# Patient Record
Sex: MALE | Race: WHITE | Employment: STUDENT | URBAN - METROPOLITAN AREA
[De-identification: names, ages, dates, MRNs, and addresses within clinical notes are randomized per-mention and may not be internally consistent; named-entity substitution may affect disease eponyms.]

---
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
## 2020-01-03 ENCOUNTER — TELEPHONE (OUTPATIENT)
Dept: ENDOCRINOLOGY | Facility: CLINIC | Age: 20
End: 2020-01-03

## 2020-01-03 NOTE — TELEPHONE ENCOUNTER
Patient has not been seen since April 2019. Called and spoke with mom to schedule an appointment. Appointment was scheduled on 1/23 at 12:30pm.    Nanda GONZALEZ ([place]), RN  Pediatric Diabetes Educator  [facility]  [phone]

## 2020-01-23 ENCOUNTER — OFFICE VISIT (OUTPATIENT)
Dept: ENDOCRINOLOGY | Facility: CLINIC | Age: 20
End: 2020-01-23
Attending: PEDIATRICS
Payer: COMMERCIAL

## 2020-01-23 VITALS
WEIGHT: 162.26 LBS | HEART RATE: 80 BPM | BODY MASS INDEX: 22.72 KG/M2 | SYSTOLIC BLOOD PRESSURE: 111 MMHG | HEIGHT: 71 IN | DIASTOLIC BLOOD PRESSURE: 64 MMHG

## 2020-01-23 DIAGNOSIS — Z23 INFLUENZA VACCINE NEEDED: ICD-10-CM

## 2020-01-23 DIAGNOSIS — E10.65 TYPE 1 DIABETES MELLITUS WITH HYPERGLYCEMIA (H): Primary | ICD-10-CM

## 2020-01-23 LAB
CREAT UR-MCNC: 418 MG/DL
HBA1C MFR BLD: 7.9 % (ref 0–5.6)
MICROALBUMIN UR-MCNC: 18 MG/L
MICROALBUMIN/CREAT UR: 4.35 MG/G CR (ref 0–17)

## 2020-01-23 PROCEDURE — G0463 HOSPITAL OUTPT CLINIC VISIT: HCPCS | Mod: ZF,25

## 2020-01-23 PROCEDURE — 83036 HEMOGLOBIN GLYCOSYLATED A1C: CPT | Mod: ZF | Performed by: PEDIATRICS

## 2020-01-23 PROCEDURE — 82043 UR ALBUMIN QUANTITATIVE: CPT | Performed by: PEDIATRICS

## 2020-01-23 PROCEDURE — 36416 COLLJ CAPILLARY BLOOD SPEC: CPT | Mod: ZF

## 2020-01-23 PROCEDURE — 25000128 H RX IP 250 OP 636: Mod: ZF

## 2020-01-23 PROCEDURE — 90686 IIV4 VACC NO PRSV 0.5 ML IM: CPT | Mod: ZF

## 2020-01-23 PROCEDURE — G0008 ADMIN INFLUENZA VIRUS VAC: HCPCS | Mod: ZF

## 2020-01-23 ASSESSMENT — PAIN SCALES - GENERAL: PAINLEVEL: NO PAIN (0)

## 2020-01-23 ASSESSMENT — MIFFLIN-ST. JEOR: SCORE: 1774.13

## 2020-01-23 NOTE — PROGRESS NOTES
Pediatric Endocrinology Return Consultation:  Diabetes  :   Patient: Nicholas M Dubina MRN# [id number]   YOB: 2000 Age: 19 year old   Date of Visit: 1/23/2020  Dear Dr. Pam Gomes:    I had the pleasure of seeing your patient, Nicholas M Dubina in the Pediatric Endocrinology Clinic, [facility], on 1/23/2020 for a return consultation regarding type 1 diabetes .           Problem list:     Patient Active Problem List    Diagnosis Date Noted     Type 1 diabetes mellitus with hyperglycemia (H) 02/04/2016     Priority: Medium     Emesis 12/15/2013     Priority: Medium     Irritability and anger 09/22/2009     Priority: Medium     Followed by Dr. Prado  Initiating 504 plan.       Underweight 09/22/2009     Priority: Medium            HPI:   Oziel is a 19 year old male with Type 1 diabetes mellitus who was accompanied to this appointment by his mother.    I have reviewed the available past laboratory evaluations, imaging studies, and medical records available to me at this visit. I have reviewed  Oziel' height and weight.    History was obtained from the patient and the medical record.    I independently reviewed and interpretted the blood glucose, sensor and pump downloads.      Overall average: 191 mg/dL, SD 91. BG checks/day: 2 + sensor.Boluses /day: 2.2 %bolus: 23  Total insulin, units per day: 50  Percent time in range: 45  Percent time in hypoglycemia: 7     A1c:  Today s hemoglobin A1c: 7.9  Previous two HbA1c results:   Lab Results   Component Value Date    A1C 7.9 01/23/2020    A1C 7.8 04/18/2019      Result was discussed at today's visit.     Current insulin regimen:   Insulin pump:  Medtronic MiniMed 530. Dexcom G6  12 AM: 1.4  6 AM: 1.5  11AM: 1.7  4PM: 1.8  10PM: 1.6  Carb ratio:    12A: 6  6A: 6    11A: 6  Correction    12A 1:30  Targets  12A 100-150  7A   9P 100-150    Insulin administration site(s): buttocks    Family history and social  history were reviewed and updated from last visit.          Past Medical History:     Past Medical History:   Diagnosis Date     Diabetes mellitus, type 1 3/11/2009     Irritability and anger 2009    Followed by Dr. Kam.  Initiating 504 plan.     Transitory tachypnea of      Level 2 nursery x 30 hours     Underweight 2009     Unspecified fetal and  jaundice     Peak bilirubin = 18.0. Received phototherapy            Past Surgical History:     Past Surgical History:   Procedure Laterality Date     NO HISTORY OF SURGERY                 Social History:     Social History     Social History Narrative    7th grade ([id number])        Environmental History    Child is around people that smoke: No     Child's home has well water: Yes    Child's home has filtered water:No    Child has pets in the home: 2 dogs outside and 1 inside cat    Child's home/apartment was built before 1950:No    Child attends :     Child has exposure to sibling/playmate with lead poisoning: No    Child has exposure to someone with tuberculosis: No    Child home have guns/firearms without trigger locks: Yes    Child home have guns/firearms with trigger locks: No    There are concerns about the child's exposure to violence in the home: May be concerned about violence that comes from other kids (outside the home).                Parent #1    Name: Deborah A. Dubina, F, 66  Education: College   Occupation:     Parent #2    Name: Edward S. Dubina, M, 65  Education: College   Occupation: Homemaker        Relationship Status of Parent(s):     Who does-he child live with? mother and father    What language(s) is/are spoken at home? English         2018. Lives in [place] with his parents, his 4 sisters and his sister's boyfriend.  Just started at [employer]. Not a strong student. Interested in mechanical engineering.  Works part time at [employer]. Used  to play hockey, no regular exercise now.        November 2018. 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 Eats massive amounts of protein, very little carb.        April 2019. Not currently working, though looking into going to school in the near future.        January 2020.  Just started at [employer] studying milling (precision metal work). Exercise is playing frisbee with his dog.  They live in [place].              Family History:     Family History   Problem Relation Age of Onset     Arthritis Mother         rheumatoid     Asthma Father      Allergies Father      Thyroid Disease Maternal Grandmother         hyper     Diabetes Other         type 1     C.A.D. Sister         prolonged QT; dysautonomia     Asthma Sister             Allergies:     Allergies   Allergen Reactions     Latex Other (See Comments)     SITE INFECTIONS             Medications:     Current Outpatient Rx   Medication Sig Dispense Refill     acetaminophen (TYLENOL) 500 MG tablet Take 1 tablet (500 mg) by mouth every 6 hours as needed 20 tablet 0     betamethasone valerate (VALISONE) 0.1 % ointment Apply topically 2 times daily 45 g 0     chlorhexidine (HIBICLENS) 4 % external liquid Use to clean off skin prior to pump site insertions. 120 mL 6     Continuous Blood Gluc Sensor (DEXCOM G6 SENSOR) MISC 1 each every 10 days 3 each 11     Continuous Blood Gluc Transmit (DEXCOM G6 TRANSMITTER) MISC 1 each every 3 months 1 each 3     CONTOUR NEXT TEST test strip Use to test blood sugar 6 times daily or as directed. 550 each 3     glucagon (GLUCAGON EMERGENCY) 1 MG injection Inject 1 mg into the muscle once for 1 dose For unconscious hypoglycemia only - dispense 2 kits (1 home and 1 school) 2 each 0     ibuprofen (ADVIL,MOTRIN) 200 MG tablet Take 400 mg by mouth every 4 hours as needed        insulin glargine (LANTUS SOLOSTAR) 100 UNIT/ML injection Patient to use 32 Units once daily when off the  "pump 15 mL 11     insulin glargine (LANTUS SOLOSTAR) 100 UNIT/ML pen Use up to 50 units daily. 30 mL 11     insulin lispro (HUMALOG KWIKPEN) 100 UNIT/ML injection Use up to 50 units daily. 30 mL 3     insulin lispro (HUMALOG KWIKPEN) 100 UNIT/ML pen Use up to 50 units daily per MD instructions 30 mL 6     insulin lispro (HUMALOG) 100 UNIT/ML vial Patient uses up to 100 units per day via insulin pump. 90 mL 0     insulin pen needle (B-D U/F) 31G X 5 MM Use 6 time(s) a day. 200 each 3     Insulin Pen Needle 31G X 4 MM MISC 6 each daily 200 each 3     INSULIN PUMP ACCESSORIES MISC None Entered       ketone blood test (PRECISION XTRA KETONE) STRP Check ketones PRN during sick days and insulin pump set malfunctions 20 each 3     melatonin 3 MG tablet Take 3 mg by mouth nightly as needed.       ondansetron (ZOFRAN) 4 MG tablet Take 1 tablet (4 mg) by mouth every 8 hours as needed for nausea 6 tablet 0     Pediatric Multivit-Minerals-C (FLINTSTONES SOUR GUMMIES PO) Take 1 chew tab by mouth daily               Review of Systems:     Comprehensive ROS negative other than the symptoms noted above in the HPI.          Physical Exam:   Blood pressure 111/64, pulse 80, height 1.805 m (5' 11.06\"), weight 73.6 kg (162 lb 4.1 oz).  Blood pressure percentiles are not available for patients who are 18 years or older.  Height: 5' 11.063\", 70 %ile based on CDC (Boys, 2-20 Years) Stature-for-age data based on Stature recorded on 1/23/2020.  Weight: 162 lbs 4.14 oz, 61 %ile based on CDC (Boys, 2-20 Years) weight-for-age data based on Weight recorded on 1/23/2020.  BMI: Body mass index is 22.59 kg/m ., 46 %ile based on CDC (Boys, 2-20 Years) BMI-for-age based on body measurements available as of 1/23/2020.      CONSTITUTIONAL:   Awake, alert, and in no apparent distress.  HEAD: Normocephalic, without obvious abnormality.  EYES: Lids and lashes normal, sclera clear, conjunctiva normal.  ENT: external ears without lesions, nares clear, oral " pharynx with moist mucus membranes.  NECK: Supple, symmetrical, trachea midline.  THYROID: symmetric, not enlarged and no tenderness.  HEMATOLOGIC/LYMPHATIC: No cervical lymphadenopathy.  LUNGS: No increased work of breathing, clear to auscultation  with good air entry  CARDIOVASCULAR: Regular rate and rhythm, no murmurs.  ABDOMEN: Soft, non-distended, non-tender, no masses palpated, no hepatosplenomegally.  NEUROLOGIC:No focal deficits noted.   PSYCHIATRIC: Cooperative, no agitation.  SKIN: Insulin administration sites intact without lipohypertrophy. No acanthosis nigricans.  MUSCULOSKELETAL:  Full range of motion noted.  Motor strength and tone are normal.  FEET:  Normal        Laboratory results:     TSH   Date Value Ref Range Status   08/30/2018 2.05 0.40 - 4.00 mU/L Final     Tissue Transglutaminase Antibody IgA   Date Value Ref Range Status   08/30/2018 1 <7 U/mL Final     Comment:     Negative  The tTG-IgA assay has limited utility for patients with decreased levels of   IgA. Screening for celiac disease should include IgA testing to rule out   selective IgA deficiency and to guide selection and interpretation of   serological testing. tTG-IgG testing may be positive in celiac disease   patients with IgA deficiency.       Tissue Transglutaminase Poonam IgG   Date Value Ref Range Status   08/30/2018 <1 <7 U/mL Final     Comment:     Negative     Testosterone Total   Date Value Ref Range Status   12/04/2014 118 0 - 1,200 ng/dL Final     Cholesterol   Date Value Ref Range Status   08/30/2018 130 <170 mg/dL Final     Albumin Urine mg/L   Date Value Ref Range Status   08/30/2018 14 mg/L Final     Triglycerides   Date Value Ref Range Status   08/30/2018 107 (H) <90 mg/dL Final     Comment:     Borderline high:   mg/dl  High:            >129 mg/dl       HDL Cholesterol   Date Value Ref Range Status   08/30/2018 41 (L) >45 mg/dL Final     Comment:     Low:             <40 mg/dl  Borderline low:   40-45 mg/dl        LDL Cholesterol Calculated   Date Value Ref Range Status   08/30/2018 68 <110 mg/dL Final     Cholesterol/HDL Ratio   Date Value Ref Range Status   12/04/2014 2.3 0.0 - 5.0 Final     Non HDL Cholesterol   Date Value Ref Range Status   08/30/2018 89 <120 mg/dL Final     Lab Results   Component Value Date    A1C 7.9 01/23/2020    A1C 7.8 04/18/2019    A1C 8.9 11/15/2018    A1C 10.3 10/04/2018    A1C 10.3 10/04/2018      Lab Results   Component Value Date    HEMOGLOBINA1 10.1 09/15/2011    HEMOGLOBINA1 10.0 06/30/2011    HEMOGLOBINA1 9.9 03/10/2011    HEMOGLOBINA1 10.6 12/09/2010    HEMOGLOBINA1 11.0 10/14/2010             Diabetes Health Maintenance    Date of Diabetes Diagnosis:  2009, age 9  Type of Diabetes:  Type 1    Date Last Eye Exam and location: >1 year  Date Last Flu Shot (note if refused): today  Date Last Annual Lab Studies---- 8/2018 urine today  Date of Last Visit:  4/3019    IgA Deficient (yes/no, date screened):   IGA   Date Value Ref Range Status   03/04/2010 163 45 - 235 mg/dL Final     Celiac Screen (annual):   Tissue Transglutaminase Antibody IgA   Date Value Ref Range Status   08/30/2018 1 <7 U/mL Final     Comment:     Negative  The tTG-IgA assay has limited utility for patients with decreased levels of   IgA. Screening for celiac disease should include IgA testing to rule out   selective IgA deficiency and to guide selection and interpretation of   serological testing. tTG-IgG testing may be positive in celiac disease   patients with IgA deficiency.       Thyroid (every 2 years):   TSH   Date Value Ref Range Status   08/30/2018 2.05 0.40 - 4.00 mU/L Final      T4 Free   Date Value Ref Range Status   08/30/2018 0.99 0.76 - 1.46 ng/dL Final     Lipids (every 5 years age 10 and older):   Recent Labs   Lab Test 08/30/18  1202 06/02/16  1134 12/04/14  1342 08/21/14  1019   CHOL 130 178* 152 139   HDL 41* 66 65 72   LDL 68 84 73 60   TRIG 107* 140* 71 34   CHOLHDLRATIO  --   --  2.3 1.9        Missed days of school related to diabetes concerns (illness, hypoglycemia, parental worry since last visit due to DM, excluding routine medical visits): 0    Today's PHQ-2 Mental Health Survey Score (every visit age 10 and older depression screening):  0         Assessment and Plan:   Oziel is a 19 year old male with uncontrolled diabetes.  His A1c doesn't look bad, but it is because he is having both highs and lows.  The main problem is that he is not covering carbs, and then somewhat randomly gives himself insulin once he is high.  He knows how, he says he just doesn't think of it.  But he is willing to try harder over the next 6 weeks..     Diabetes is a complicated and dangerous illness which requires intensive monitoring and treatment to prevent both short-term and long-term consequences to various organs. Insulin therapy is life-saving, but is also associated with life-threatening toxicity (hypoglycemia).  Careful and continuous attention to balancing glucose levels, activity, diet and insulin dosage is necessary.    I have reviewed the data and the therapy plan with the patient, and with the diabetes nurse educator who will communicate with the patient between visits to adjust insulin as needed.      Patient Instructions        Thank you for choosing [facility].    It was a pleasure to see you today!     Alexia Robles MD, Karen Chance MD, Maurice Littlejohn MD, Mike Liu MD,  Hina Gooden MD [facility],  Candace Barajas RN CNP, Farheen Ingram RN CNP    [place]:  Lavern Austin MD,  Saturnino Interiano MD, Reinier iWseman MD    Visit Goals:  1. Your HbA1c today is 7.9.  While this is a pretty good HbA1c, you are having both highs and lows and they are averaging each other out. I would like to see this A1c with smoother numbers.  ---Goal HbA1c for all children up to 19 years of age (based on ADA goals):   < 7.5%.  ---Goal HbA1c for adults (age 19+):  HbA1c <7%    Changes to diabetes plan:   Nice  to see you today.  Your blood sugars are both high and low, and I think the problem is not paying attention to your eating and regular bolusing.  Please take the next 6 weeks and work really hard at this, and then we'll see if we need to make dose changes:  1.  Bolus every time you eat or drink, 15 minutes ahead of time.  2.  Count your carbs carefully  3.  Call if you start to have lows once you are given more meal coverage.  4.  Flu shot today  5.  Urine albumin today  6.  Schedule an eye exam    We talked about switching to the new Medtronic system when it comes out.    YOUR INSULIN DOSE IS:  Insulin pump:  Medtronic MiniMed 530. Dexcom G6  12 AM: 1.4  6 AM: 1.5  11AM: 1.7  4PM: 1.8  10PM: 1.6  Carb ratio:    12A: 6  6A: 6    11A: 6  Correction    12A 1:30  Targets  12A 100-150  7A   9P 100-150    2. We recommend checking blood sugars 4-6 times per day, every day or using a sensor  Goal blood sugars:   fasting,  pre-meal, <180 2 hours after a meal.  (Higher fasting and bedtime numbers may be targeted for children under 5 yearsof age.)    3. Eye Doctor visit next due: schedule this please  4. We recommend every patient with diabetes receive the flu shot every year.--today  5. Follow up in 6 weeks    Sick Day Plan:  Pump Failure:  IF YOUR PUMP FAILS AND YOU NEED TO TAKE BASAL INSULIN (GLARGINE, BASAGLAR, TRESIBA, LEVEMIR) THE DOSE IS: 39 units  Remember when you restart your pump that the basal insulin lasts 24 hours so wait until 24 hours is up before starting your pump basal rate.Call on-call endocrinologist or diabetes nurse if this happens. You should also plan to call the pump company right away to troubleshoot the pump failure.    Hyperglycemia (high blood glucose):  Ketones:  Check urine/blood ketones if Oziel is sick, vomiting, or if blood glucose is above 240 twice in a row. Call on-call endocrinologist or diabetes nurse if ketones are present.    Hypoglycemia (low blood glucose):  If  blood glucose is 60 to 80:  1.  Eat or drink 1 carb unit (15 grams carbohydrate).   One carb unit equals:   - 1/2 cup (4 ounces) juice or regular soda pop, or   - 1 cup (8 ounces) milk, or   - 3 to 4 glucose tablets  2.  Re-check your blood glucose in 15 minutes.  3.  Repeat these steps every 15 minutes until your blood glucose is above 100.    If blood glucose is under 60:  1.  Eat or drink 2 carb units (30 grams carbohydrate).  Two carb units equal:   - 1 cup (8 ounces) juice or regular soda pop, or   - 2 cups (16 ounces) milk, or   - 6 to 8 glucose tablets.  2.  Re-check your blood glucose in 15 minutes.  3.  Repeat these steps every 15 minutes until your blood glucose is above 100.      If you had any blood work, imaging or other tests:  Normal test results will be mailed to your home address in a letter.  Abnormal results will be communicated to you via phone call / letter.  Please allow 2 weeks for processing/interpretation of most lab work.  For urgent issues that cannot wait until the next business day, call [phone] and ask for the Pediatric Endocrinologist on call.    You may contact the diabetes nurses with any questions at [phone].  Randi Dasilva, RN, BSN; Elvia Navarro RN; or Nanda Lino RN, BAN may answer, depending on the day. Calls will be returned as soon as possible.      Medication renewal requests must be faxed to the main office by your pharmacy.  Allow 3-4 days for completion.   Main Office: [phone]  Fax: [phone]    Scheduling:    Pediatric Call Center for [facility] and [facility], [phone]  [facility], 9th floor [phone]  Infusion Center: [phone] (for stimulation tests)  Radiology/ [phone]     Services:   [phone]     We encourage you to sign up for [employer] for easy communication with us.  Sign up at the clinic  or go to [email or web address].     Please try the Passport to [facility]  [facility]) phone application for Virtual Tours, Procedure Preparation, Resources, Preparation for Hospital Stay and the Coloring Board.           Thank you for allowing me to participate in the care of your patient.  Please do not hesitate to call with questions or concerns.    Sincerely,    Farzana Littlejohn MD  Professor and   Pediatric Endocrinology  [facility]    ALBERTO SANTIAGO

## 2020-01-23 NOTE — PATIENT INSTRUCTIONS
Thank you for choosing [facility].    It was a pleasure to see you today!     Alexia Robles MD, Karen Chance MD, Maurice Littlejohn MD, Mike Liu MD,  Hina Gooden MD [facility],  Candace Barajas RN CNP, Farheen Ingram RN CNP    [place]:  Lavern Austin MD,  Saturnino Interiano MD, Reinier Wiseman MD    Visit Goals:  1. Your HbA1c today is 7.9.  While this is a pretty good HbA1c, you are having both highs and lows and they are averaging each other out. I would like to see this A1c with smoother numbers.  ---Goal HbA1c for all children up to 19 years of age (based on ADA goals):   < 7.5%.  ---Goal HbA1c for adults (age 19+):  HbA1c <7%    Changes to diabetes plan:   Nice to see you today.  Your blood sugars are both high and low, and I think the problem is not paying attention to your eating and regular bolusing.  Please take the next 6 weeks and work really hard at this, and then we'll see if we need to make dose changes:  1.  Bolus every time you eat or drink, 15 minutes ahead of time.  2.  Count your carbs carefully  3.  Call if you start to have lows once you are given more meal coverage.  4.  Flu shot today  5.  Urine albumin today  6.  Schedule an eye exam    We talked about switching to the new Medtronic system when it comes out.    YOUR INSULIN DOSE IS:  Insulin pump:  Medtronic MiniMed 530. Dexcom G6  12 AM: 1.4  6 AM: 1.5  11AM: 1.7  4PM: 1.8  10PM: 1.6  Carb ratio:    12A: 6  6A: 6    11A: 6  Correction    12A 1:30  Targets  12A 100-150  7A   9P 100-150    2. We recommend checking blood sugars 4-6 times per day, every day or using a sensor  Goal blood sugars:   fasting,  pre-meal, <180 2 hours after a meal.  (Higher fasting and bedtime numbers may be targeted for children under 5 yearsof age.)    3. Eye Doctor visit next due: schedule this please  4. We recommend every patient with diabetes receive the flu shot every year.--today  5. Follow up in 6 weeks    Sick Day Plan:  Pump  Failure:  IF YOUR PUMP FAILS AND YOU NEED TO TAKE BASAL INSULIN (GLARGINE, BASAGLAR, TRESIBA, LEVEMIR) THE DOSE IS: 39 units  Remember when you restart your pump that the basal insulin lasts 24 hours so wait until 24 hours is up before starting your pump basal rate.Call on-call endocrinologist or diabetes nurse if this happens. You should also plan to call the pump company right away to troubleshoot the pump failure.    Hyperglycemia (high blood glucose):  Ketones:  Check urine/blood ketones if Oziel is sick, vomiting, or if blood glucose is above 240 twice in a row. Call on-call endocrinologist or diabetes nurse if ketones are present.    Hypoglycemia (low blood glucose):  If blood glucose is 60 to 80:  1.  Eat or drink 1 carb unit (15 grams carbohydrate).   One carb unit equals:   - 1/2 cup (4 ounces) juice or regular soda pop, or   - 1 cup (8 ounces) milk, or   - 3 to 4 glucose tablets  2.  Re-check your blood glucose in 15 minutes.  3.  Repeat these steps every 15 minutes until your blood glucose is above 100.    If blood glucose is under 60:  1.  Eat or drink 2 carb units (30 grams carbohydrate).  Two carb units equal:   - 1 cup (8 ounces) juice or regular soda pop, or   - 2 cups (16 ounces) milk, or   - 6 to 8 glucose tablets.  2.  Re-check your blood glucose in 15 minutes.  3.  Repeat these steps every 15 minutes until your blood glucose is above 100.      If you had any blood work, imaging or other tests:  Normal test results will be mailed to your home address in a letter.  Abnormal results will be communicated to you via phone call / letter.  Please allow 2 weeks for processing/interpretation of most lab work.  For urgent issues that cannot wait until the next business day, call [phone] and ask for the Pediatric Endocrinologist on call.    You may contact the diabetes nurses with any questions at [phone].  Randi Dasilva, RN, BSN; Elvia Navarro RN; or Nanad Lino RN, BAN may answer,  depending on the day. Calls will be returned as soon as possible.      Medication renewal requests must be faxed to the main office by your pharmacy.  Allow 3-4 days for completion.   Main Office: [phone]  Fax: [phone]    Scheduling:    Pediatric Call Center for [facility], [phone]  [facility], 9th floor [phone]  Infusion Center: [phone] (for stimulation tests)  Radiology/ [phone]     Services:   [phone]     We encourage you to sign up for [employer] for easy communication with us.  Sign up at the clinic  or go to [email or web address].     Please try the Passport to [facility] ([facility]) phone application for Virtual Tours, Procedure Preparation, Resources, Preparation for Hospital Stay and the Coloring Board.

## 2020-01-23 NOTE — LETTER
1/23/2020      RE: Nicholas M Dubina  [address] [place] [id number]       Pediatric Endocrinology Return Consultation:  Diabetes  :   Patient: Nicholas M Dubina MRN# [id number]   YOB: 2000 Age: 19 year old   Date of Visit: 1/23/2020  Dear Dr. Pam Gomes:    I had the pleasure of seeing your patient, Nicholas M Dubina in the Pediatric Endocrinology Clinic, [facility], on 1/23/2020 for a return consultation regarding type 1 diabetes .           Problem list:     Patient Active Problem List    Diagnosis Date Noted     Type 1 diabetes mellitus with hyperglycemia (H) 02/04/2016     Priority: Medium     Emesis 12/15/2013     Priority: Medium     Irritability and anger 09/22/2009     Priority: Medium     Followed by Dr. Prado  Initiating 504 plan.       Underweight 09/22/2009     Priority: Medium            HPI:   Oziel is a 19 year old male with Type 1 diabetes mellitus who was accompanied to this appointment by his mother.    I have reviewed the available past laboratory evaluations, imaging studies, and medical records available to me at this visit. I have reviewed  Oziel' height and weight.    History was obtained from the patient and the medical record.    I independently reviewed and interpretted the blood glucose, sensor and pump downloads.      Overall average: 191 mg/dL, SD 91. BG checks/day: 2 + sensor.Boluses /day: 2.2 %bolus: 23  Total insulin, units per day: 50  Percent time in range: 45  Percent time in hypoglycemia: 7     A1c:  Today s hemoglobin A1c: 7.9  Previous two HbA1c results:   Lab Results   Component Value Date    A1C 7.9 01/23/2020    A1C 7.8 04/18/2019      Result was discussed at today's visit.     Current insulin regimen:   Insulin pump:  Medtronic MiniMed 530. Dexcom G6  12 AM: 1.4  6 AM: 1.5  11AM: 1.7  4PM: 1.8  10PM: 1.6  Carb ratio:    12A: 6  6A: 6    11A: 6  Correction    12A 1:30  Targets  12A 100-150  7A   9P  100-150    Insulin administration site(s): buttocks    Family history and social history were reviewed and updated from last visit.          Past Medical History:     Past Medical History:   Diagnosis Date     Diabetes mellitus, type 1 3/11/2009     Irritability and anger 2009    Followed by Dr. Kam.  Initiating 504 plan.     Transitory tachypnea of      Level 2 nursery x 30 hours     Underweight 2009     Unspecified fetal and  jaundice     Peak bilirubin = 18.0. Received phototherapy            Past Surgical History:     Past Surgical History:   Procedure Laterality Date     NO HISTORY OF SURGERY                 Social History:     Social History     Social History Narrative    7th grade ([id number])        Environmental History    Child is around people that smoke: No     Child's home has well water: Yes    Child's home has filtered water:No    Child has pets in the home: 2 dogs outside and 1 inside cat    Child's home/apartment was built before 1950:No    Child attends :     Child has exposure to sibling/playmate with lead poisoning: No    Child has exposure to someone with tuberculosis: No    Child home have guns/firearms without trigger locks: Yes    Child home have guns/firearms with trigger locks: No    There are concerns about the child's exposure to violence in the home: May be concerned about violence that comes from other kids (outside the home).                Parent #1    Name: Deborah A. Dubina, F, 66  Education: College   Occupation:     Parent #2    Name: Edward S. Dubina, M, 65  Education: College   Occupation: Homemaker        Relationship Status of Parent(s):     Who does-he child live with? mother and father    What language(s) is/are spoken at home? English         2018. Lives in [place] with his parents, his 4 sisters and his sister's boyfriend.  Just started at [employer]. Not a strong student.  Interested in [employer] engineering.  Works part time at [employer]. Used to play hockey, no regular exercise now.        November 2018. 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 Eats massive amounts of protein, very little carb.        April 2019. Not currently working, though looking into going to school in the near future.        January 2020.  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 Exercise is playing frisbee with his dog.  [place] live in [place].              Family History:     Family History   Problem Relation Age of Onset     Arthritis Mother         rheumatoid     Asthma Father      Allergies Father      Thyroid Disease Maternal Grandmother         hyper     Diabetes Other         type 1     C.A.D. Sister         prolonged QT; dysautonomia     Asthma Sister             Allergies:     Allergies   Allergen Reactions     Latex Other (See Comments)     SITE INFECTIONS             Medications:     Current Outpatient Rx   Medication Sig Dispense Refill     acetaminophen (TYLENOL) 500 MG tablet Take 1 tablet (500 mg) by mouth every 6 hours as needed 20 tablet 0     betamethasone valerate (VALISONE) 0.1 % ointment Apply topically 2 times daily 45 g 0     chlorhexidine (HIBICLENS) 4 % external liquid Use to clean off skin prior to pump site insertions. 120 mL 6     Continuous Blood Gluc Sensor (DEXCOM G6 SENSOR) MISC 1 each every 10 days 3 each 11     Continuous Blood Gluc Transmit (DEXCOM G6 TRANSMITTER) MISC 1 each every 3 months 1 each 3     CONTOUR NEXT TEST test strip Use to test blood sugar 6 times daily or as directed. 550 each 3     glucagon (GLUCAGON EMERGENCY) 1 MG injection Inject 1 mg into the muscle once for 1 dose For unconscious hypoglycemia only - dispense 2 kits (1 home and 1 school) 2 each 0     ibuprofen (ADVIL,MOTRIN) 200 MG tablet Take 400 mg by mouth every 4 hours as needed        insulin glargine (LANTUS  "SOLOSTAR) 100 UNIT/ML injection Patient to use 32 Units once daily when off the pump 15 mL 11     insulin glargine (LANTUS SOLOSTAR) 100 UNIT/ML pen Use up to 50 units daily. 30 mL 11     insulin lispro (HUMALOG KWIKPEN) 100 UNIT/ML injection Use up to 50 units daily. 30 mL 3     insulin lispro (HUMALOG KWIKPEN) 100 UNIT/ML pen Use up to 50 units daily per MD instructions 30 mL 6     insulin lispro (HUMALOG) 100 UNIT/ML vial Patient uses up to 100 units per day via insulin pump. 90 mL 0     insulin pen needle (B-D U/F) 31G X 5 MM Use 6 time(s) a day. 200 each 3     Insulin Pen Needle 31G X 4 MM MISC 6 each daily 200 each 3     INSULIN PUMP ACCESSORIES MISC None Entered       ketone blood test (PRECISION XTRA KETONE) STRP Check ketones PRN during sick days and insulin pump set malfunctions 20 each 3     melatonin 3 MG tablet Take 3 mg by mouth nightly as needed.       ondansetron (ZOFRAN) 4 MG tablet Take 1 tablet (4 mg) by mouth every 8 hours as needed for nausea 6 tablet 0     Pediatric Multivit-Minerals-C (FLINTSTONES SOUR GUMMIES PO) Take 1 chew tab by mouth daily               Review of Systems:     Comprehensive ROS negative other than the symptoms noted above in the HPI.          Physical Exam:   Blood pressure 111/64, pulse 80, height 1.805 m (5' 11.06\"), weight 73.6 kg (162 lb 4.1 oz).  Blood pressure percentiles are not available for patients who are 18 years or older.  Height: 5' 11.063\", 70 %ile based on CDC (Boys, 2-20 Years) Stature-for-age data based on Stature recorded on 1/23/2020.  Weight: 162 lbs 4.14 oz, 61 %ile based on CDC (Boys, 2-20 Years) weight-for-age data based on Weight recorded on 1/23/2020.  BMI: Body mass index is 22.59 kg/m ., 46 %ile based on CDC (Boys, 2-20 Years) BMI-for-age based on body measurements available as of 1/23/2020.      CONSTITUTIONAL:   Awake, alert, and in no apparent distress.  HEAD: Normocephalic, without obvious abnormality.  EYES: Lids and lashes normal, sclera " clear, conjunctiva normal.  ENT: external ears without lesions, nares clear, oral pharynx with moist mucus membranes.  NECK: Supple, symmetrical, trachea midline.  THYROID: symmetric, not enlarged and no tenderness.  HEMATOLOGIC/LYMPHATIC: No cervical lymphadenopathy.  LUNGS: No increased work of breathing, clear to auscultation  with good air entry  CARDIOVASCULAR: Regular rate and rhythm, no murmurs.  ABDOMEN: Soft, non-distended, non-tender, no masses palpated, no hepatosplenomegally.  NEUROLOGIC:No focal deficits noted.   PSYCHIATRIC: Cooperative, no agitation.  SKIN: Insulin administration sites intact without lipohypertrophy. No acanthosis nigricans.  MUSCULOSKELETAL:  Full range of motion noted.  Motor strength and tone are normal.  FEET:  Normal        Laboratory results:     TSH   Date Value Ref Range Status   08/30/2018 2.05 0.40 - 4.00 mU/L Final     Tissue Transglutaminase Antibody IgA   Date Value Ref Range Status   08/30/2018 1 <7 U/mL Final     Comment:     Negative  The tTG-IgA assay has limited utility for patients with decreased levels of   IgA. Screening for celiac disease should include IgA testing to rule out   selective IgA deficiency and to guide selection and interpretation of   serological testing. tTG-IgG testing may be positive in celiac disease   patients with IgA deficiency.       Tissue Transglutaminase Poonam IgG   Date Value Ref Range Status   08/30/2018 <1 <7 U/mL Final     Comment:     Negative     Testosterone Total   Date Value Ref Range Status   12/04/2014 118 0 - 1,200 ng/dL Final     Cholesterol   Date Value Ref Range Status   08/30/2018 130 <170 mg/dL Final     Albumin Urine mg/L   Date Value Ref Range Status   08/30/2018 14 mg/L Final     Triglycerides   Date Value Ref Range Status   08/30/2018 107 (H) <90 mg/dL Final     Comment:     Borderline high:   mg/dl  High:            >129 mg/dl       HDL Cholesterol   Date Value Ref Range Status   08/30/2018 41 (L) >45 mg/dL  Final     Comment:     Low:             <40 mg/dl  Borderline low:   40-45 mg/dl       LDL Cholesterol Calculated   Date Value Ref Range Status   08/30/2018 68 <110 mg/dL Final     Cholesterol/HDL Ratio   Date Value Ref Range Status   12/04/2014 2.3 0.0 - 5.0 Final     Non HDL Cholesterol   Date Value Ref Range Status   08/30/2018 89 <120 mg/dL Final     Lab Results   Component Value Date    A1C 7.9 01/23/2020    A1C 7.8 04/18/2019    A1C 8.9 11/15/2018    A1C 10.3 10/04/2018    A1C 10.3 10/04/2018      Lab Results   Component Value Date    HEMOGLOBINA1 10.1 09/15/2011    HEMOGLOBINA1 10.0 06/30/2011    HEMOGLOBINA1 9.9 03/10/2011    HEMOGLOBINA1 10.6 12/09/2010    HEMOGLOBINA1 11.0 10/14/2010             Diabetes Health Maintenance    Date of Diabetes Diagnosis:  2009, age 9  Type of Diabetes:  Type 1    Date Last Eye Exam and location: >1 year  Date Last Flu Shot (note if refused): today  Date Last Annual Lab Studies---- 8/2018 urine today  Date of Last Visit:  4/3019    IgA Deficient (yes/no, date screened):   IGA   Date Value Ref Range Status   03/04/2010 163 45 - 235 mg/dL Final     Celiac Screen (annual):   Tissue Transglutaminase Antibody IgA   Date Value Ref Range Status   08/30/2018 1 <7 U/mL Final     Comment:     Negative  The tTG-IgA assay has limited utility for patients with decreased levels of   IgA. Screening for celiac disease should include IgA testing to rule out   selective IgA deficiency and to guide selection and interpretation of   serological testing. tTG-IgG testing may be positive in celiac disease   patients with IgA deficiency.       Thyroid (every 2 years):   TSH   Date Value Ref Range Status   08/30/2018 2.05 0.40 - 4.00 mU/L Final      T4 Free   Date Value Ref Range Status   08/30/2018 0.99 0.76 - 1.46 ng/dL Final     Lipids (every 5 years age 10 and older):   Recent Labs   Lab Test 08/30/18  1202 06/02/16  1134 12/04/14  1342 08/21/14  1019   CHOL 130 178* 152 139   HDL 41* 66 65 72    LDL 68 84 73 60   TRIG 107* 140* 71 34   CHOLHDLRATIO  --   --  2.3 1.9       Missed days of school related to diabetes concerns (illness, hypoglycemia, parental worry since last visit due to DM, excluding routine medical visits): 0    Today's PHQ-2 Mental Health Survey Score (every visit age 10 and older depression screening):  0         Assessment and Plan:   Oziel is a 19 year old male with uncontrolled diabetes.  His A1c doesn't look bad, but it is because he is having both highs and lows.  The main problem is that he is not covering carbs, and then somewhat randomly gives himself insulin once he is high.  He knows how, he says he just doesn't think of it.  But he is willing to try harder over the next 6 weeks..     Diabetes is a complicated and dangerous illness which requires intensive monitoring and treatment to prevent both short-term and long-term consequences to various organs. Insulin therapy is life-saving, but is also associated with life-threatening toxicity (hypoglycemia).  Careful and continuous attention to balancing glucose levels, activity, diet and insulin dosage is necessary.    I have reviewed the data and the therapy plan with the patient, and with the diabetes nurse educator who will communicate with the patient between visits to adjust insulin as needed.      Patient Instructions        Thank you for choosing [facility].    It was a pleasure to see you today!     Alexia Robles MD, Karen Chance MD, Maurice Littlejohn MD, Mike Liu MD,  Hina Gooden MD [facility],  Candace Barajas RN CNP, Farheen Ingram RN CNP    Irene:  Lavern Austin MD,  Saturnino Interiano MD, Reinier Wiseman MD    Visit Goals:  1. Your HbA1c today is 7.9.  While this is a pretty good HbA1c, you are having both highs and lows and they are averaging each other out. I would like to see this A1c with smoother numbers.  ---Goal HbA1c for all children up to 19 years of age (based on ADA goals):   < 7.5%.  ---Goal  HbA1c for adults (age 19+):  HbA1c <7%    Changes to diabetes plan:   Nice to see you today.  Your blood sugars are both high and low, and I think the problem is not paying attention to your eating and regular bolusing.  Please take the next 6 weeks and work really hard at this, and then we'll see if we need to make dose changes:  1.  Bolus every time you eat or drink, 15 minutes ahead of time.  2.  Count your carbs carefully  3.  Call if you start to have lows once you are given more meal coverage.  4.  Flu shot today  5.  Urine albumin today  6.  Schedule an eye exam    We talked about switching to the new Medtronic system when it comes out.    YOUR INSULIN DOSE IS:  Insulin pump:  Medtronic MiniMed 530. Dexcom G6  12 AM: 1.4  6 AM: 1.5  11AM: 1.7  4PM: 1.8  10PM: 1.6  Carb ratio:    12A: 6  6A: 6    11A: 6  Correction    12A 1:30  Targets  12A 100-150  7A   9P 100-150    2. We recommend checking blood sugars 4-6 times per day, every day or using a sensor  Goal blood sugars:   fasting,  pre-meal, <180 2 hours after a meal.  (Higher fasting and bedtime numbers may be targeted for children under 5 yearsof age.)    3. Eye Doctor visit next due: schedule this please  4. We recommend every patient with diabetes receive the flu shot every year.--today  5. Follow up in 6 weeks    Sick Day Plan:  Pump Failure:  IF YOUR PUMP FAILS AND YOU NEED TO TAKE BASAL INSULIN (GLARGINE, BASAGLAR, TRESIBA, LEVEMIR) THE DOSE IS: 39 units  Remember when you restart your pump that the basal insulin lasts 24 hours so wait until 24 hours is up before starting your pump basal rate.Call on-call endocrinologist or diabetes nurse if this happens. You should also plan to call the pump company right away to troubleshoot the pump failure.    Hyperglycemia (high blood glucose):  Ketones:  Check urine/blood ketones if Oziel is sick, vomiting, or if blood glucose is above 240 twice in a row. Call on-call endocrinologist or  diabetes nurse if ketones are present.    Hypoglycemia (low blood glucose):  If blood glucose is 60 to 80:  1.  Eat or drink 1 carb unit (15 grams carbohydrate).   One carb unit equals:   - 1/2 cup (4 ounces) juice or regular soda pop, or   - 1 cup (8 ounces) milk, or   - 3 to 4 glucose tablets  2.  Re-check your blood glucose in 15 minutes.  3.  Repeat these steps every 15 minutes until your blood glucose is above 100.    If blood glucose is under 60:  1.  Eat or drink 2 carb units (30 grams carbohydrate).  Two carb units equal:   - 1 cup (8 ounces) juice or regular soda pop, or   - 2 cups (16 ounces) milk, or   - 6 to 8 glucose tablets.  2.  Re-check your blood glucose in 15 minutes.  3.  Repeat these steps every 15 minutes until your blood glucose is above 100.      If you had any blood work, imaging or other tests:  Normal test results will be mailed to your home address in a letter.  Abnormal results will be communicated to you via phone call / letter.  Please allow 2 weeks for processing/interpretation of most lab work.  For urgent issues that cannot wait until the next business day, call [phone] and ask for the Pediatric Endocrinologist on call.    You may contact the diabetes nurses with any questions at [phone].  Randi Dasilva RN, BSN; Elvia Navarro RN; or Nanda Lino RN, BAN may answer, depending on the day. Calls will be returned as soon as possible.      Medication renewal requests must be faxed to the main office by your pharmacy.  Allow 3-4 days for completion.   Main Office: [phone]  Fax: [phone]    Scheduling:    Pediatric Call Center for [facility], [phone]  [facility], 9th floor [phone]  Infusion Center: [phone] (for stimulation tests)  Radiology/ [phone]     Services:   [phone]     We encourage you to sign up for [employer] for easy communication with us.  Sign up at the clinic  or go to  [email or web address].     Please try the Passport to [facility] phone application for Virtual Tours, Procedure Preparation, Resources, Preparation for Hospital Stay and the Coloring Board.           Thank you for allowing me to participate in the care of your patient.  Please do not hesitate to call with questions or concerns.    Sincerely,    Farzana Littlejohn MD  Professor and   Pediatric Endocrinology  [facility]    ALBERTO SANTIAGO

## 2020-01-23 NOTE — NURSING NOTE
"[facility] [[id number]]  Chief Complaint   Patient presents with     RECHECK     Diabetes follow up     Initial /64 (BP Location: Left arm, Patient Position: Sitting, Cuff Size: Adult Regular)   Pulse 80   Ht 5' 11.06\" (180.5 cm)   Wt 162 lb 4.1 oz (73.6 kg)   BMI 22.59 kg/m   Estimated body mass index is 22.59 kg/m  as calculated from the following:    Height as of this encounter: 5' 11.06\" (180.5 cm).    Weight as of this encounter: 162 lb 4.1 oz (73.6 kg).  Medication Reconciliation: complete  "

## 2020-06-11 ENCOUNTER — TELEPHONE (OUTPATIENT)
Dept: ENDOCRINOLOGY | Facility: CLINIC | Age: 20
End: 2020-06-11

## 2020-06-11 NOTE — TELEPHONE ENCOUNTER
Nanda Reed   Thu 6/11/2020 9:17 AM   To:   Deborah Dubina <[email or web address]>  That is exciting!! We will look out for that prescription. Thank you!    ----------------------------------------------------------------      Deborah Dubina <[email or web address]>   Thu 6/11/2020 9:16 AM   To:   Nanda Reed  Ok great. Let me try the phone number and password and username you have and if i can't get it we will set that appointment up. Also [employer] is going to put in for a presentation for john to get new pump and the medtronics glucose monitor system. If you can put that through when that comes in that would be great. John is excited for this.   Dominique     --------------------------------------------------------------      Nanda Reed   Thu 6/11/2020 9:11 AM   To:   Deborah Dubina <[email or web address]>    Santos Fox,     No worries, I know this can be a frustrating task. If it helps, here is the username and password we have in his chart. You also could call [employer] and they can help troubleshoot: [phone]     [employer]: [email or web address], [email or web address]!    Another option, is we can have you stop by clinic to upload his pump (we could also grab an A1c at this time). This may be an easier option. We could set up this appointment whenever is convenient for you prior to his next appointment.     Let me know!     LISA Elias (Clark), RN   Nurse Care Coordinator, Pediatric Diabetes   [facility]   Ph: [phone]  Fax: [phone]    ----------------------------------------------------------    Deborah Dubina <[email or web address]>   Thu 6/11/2020 8:27 AM   To:   Nanda Reed. I may have to call and get a new thing. I tried every password and user name we have and it just said we will send you a link yo your email. I never get a link. I rescheduled to then25th but will call today and still try  and get things over to you guys as like a trial run. I'm sorry. Im trying and I'm frustrated. But we will get it to work somehow.     Thank you for all the help. Let me know if there is a number I can call to speed this up.     Dominique    ---------------------------------------------------------    Deborah Dubina <[email or web address]">   Wed 6/10/2020 8:23 AM   To:   Nanda Reed  My daughter worked late. So she is bringing my computer tonight. So I will get it loaded tonight. Im sorry. I will email you as soon as i get it done tonight.   Thanks   Dominique    ---------------------------------------------------------  Deborah Dubina <[email or web address]>   Mon 6/8/2020 10:48 AM   To:   Nanda Reed <[email or web address]>  Santos Vee. Yes I will definitely try and do this when I get home from. Thank you for the reminder.   Dominique    ---------------------------------------------------------  Nanda Reed   Mon 6/8/2020 10:34 AM   To:   [email or web address]"    Santos Fox,     I just tried to call you regarding Francisco's appointment on Thursday with Dr. Littlejohn. We will need his pump data prior to that appointment. Could you please upload his pump today/tonight for us?     Let me know if this is possible. Thank you!     LISA Elias (Clark), RN   Nurse Care Coordinator, Pediatric Diabetes   [facility]   Ph: [phone]  Fax: [phone]

## 2020-06-24 DIAGNOSIS — E10.65 TYPE 1 DIABETES MELLITUS WITH HYPERGLYCEMIA (H): ICD-10-CM

## 2020-06-24 RX ORDER — BLOOD KETONE TEST, STRIPS
STRIP MISCELLANEOUS
Qty: 20 EACH | Refills: 11 | Status: SHIPPED | OUTPATIENT
Start: 2020-06-24 | End: 2020-12-03

## 2020-06-24 NOTE — PROGRESS NOTES
"Nicholas M Dubina is a 20 year old male who is being evaluated via a billable video visit.      The patient has been notified of following:     \"This video visit will be conducted via a call between you and your physician/provider. We have found that certain health care needs can be provided without the need for an in-person physical exam.  This service lets us provide the care you need with a video conversation.  If a prescription is necessary we can send it directly to your pharmacy.  If lab work is needed we can place an order for that and you can then stop by our lab to have the test done at a later time.    Video visits are billed at different rates depending on your insurance coverage.  Please reach out to your insurance provider with any questions.    If during the course of the call the physician/provider feels a video visit is not appropriate, you will not be charged for this service.\"    Patient has given verbal consent for Video visit? Yes    Will anyone else be joining your video visit? No        Video-Visit Details    Type of service:  Video Visit    Video Start Time: 3:44 PM  Video End Time: 4:07 PM    Originating Location (pt. Location): Home    Distant Location (provider location):  [facility]     Platform used for Video Visit: MODLOFT    Pediatric Endocrinology Return Consultation:  Diabetes Video Virtual Visit  :   Patient: Nicholas M Dubina MRN# [id number]   YOB: 2000 Age: 20 year old   Date of Visit: 6/25/2020  Dear  Referred Self:    I had the pleasure of visiting with your patient, Nicholas M Dubina on a video virtual visit from the Pediatric Endocrinology Clinic, [facility], on 6/25/2020 for a return consultation regarding type 1 diabetes .           Problem list:     Patient Active Problem List    Diagnosis Date Noted     Type 1 diabetes mellitus with hyperglycemia (H) 02/04/2016     Priority: Medium     Emesis 12/15/2013     " Priority: Medium     Irritability and anger 09/22/2009     Priority: Medium     Followed by Dr. Kam.  Initiating 504 plan.       Underweight 09/22/2009     Priority: Medium            HPI:   Oziel is a 20 year old male with Type 1 diabetes mellitus. I had a video visit with him and his mother.    I have reviewed the available past laboratory evaluations, imaging studies, and medical records available to me at this visit.    History was obtained from the patient and the medical record.    I independently reviewed and interpretted the blood glucose and pump downloads.     Overall average: 298 mg/dL, SD 36. However, this was just the one meter that he downloaded. He read off other numbers to me that were better.  These high numbers have been in the morning since he went off the pump and went back to Lantus. He went back to his previous Lantus dose, but that is less insulin that the pump was giving him as basal.     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 Thus, he has switched to shots.  Also he hasn't been wearing his Dexcom---it is not that it fails once he is wearing it, he can't seem to get it on.      He just got the new Medtronic system including the sensor, and is eager to get started on that and hoping he will have more success with it than with the Dexcom. Mom plans to call today to set up training.    Total insulin, units per day when on the pump: 38 (this was when he was only getting basal insulin and giving himself shots for bolus) -56 units (when he was bolusing through the pump.  He is on a relatively low carbohydrate diet.     Result was discussed at today's visit.     Current insulin / diabetes medication regimen:   When he is on Lantus (currently):  Lantus 32 units  Carb ratio 6  Correction 1 per 30 over 120    When he is on the pump:  Insulin pump:  Medtronic MiniMed 530. Dexcom G6 (not currently wearing sensor)  12 AM: 1.4  6  AM: 1.5  11AM: 1.7  4PM: 1.8  10PM: 1.6  Carb ratio:    12A: 6  6A: 6    11A: 6  Correction    12A 1:30  Targets  12A 100-150  7A   9P 100-150      A1c:  Previous two HbA1c results:   Lab Results   Component Value Date    A1C 7.9 2020    A1C 7.8 2019        Family history and social history were reviewed and updated from last visit.          Past Medical History:     Past Medical History:   Diagnosis Date     Diabetes mellitus, type 1 3/11/2009     Irritability and anger 2009    Followed by Dr. Kam.  Initiating 504 plan.     Transitory tachypnea of      Level 2 nursery x 30 hours     Underweight 2009     Unspecified fetal and  jaundice     Peak bilirubin = 18.0. Received phototherapy            Past Surgical History:     Past Surgical History:   Procedure Laterality Date     NO HISTORY OF SURGERY                 Social History:     Social History     Social History Narrative    7th grade (6835-5071)        Environmental History    Child is around people that smoke: No     Child's home has well water: Yes    Child's home has filtered water:No    Child has pets in the home: 2 dogs outside and 1 inside cat    Child's home/apartment was built before 1950:No    Child attends :     Child has exposure to sibling/playmate with lead poisoning: No    Child has exposure to someone with tuberculosis: No    Child home have guns/firearms without trigger locks: Yes    Child home have guns/firearms with trigger locks: No    There are concerns about the child's exposure to violence in the home: May be concerned about violence that comes from other kids (outside the home).                Parent #1    Name: Deborah A. Dubina, F, 66  Education: College   Occupation:     Parent #2    Name: Edward S. Dubina, M, 65  Education: College   Occupation: Homemaker        Relationship Status of Parent(s):     Who does-he child live with? mother and  father    What language(s) is/are spoken at home? English         August 2018. Lives in [place] with his parents, his 4 sisters and his sister's boyfriend.  Just started at [employer]. Not a strong student. Interested in mechanical engineering.  Works part time at [employer]. Used to play hockey, no regular exercise now.        November 2018. 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 Eats massive amounts of protein, very little carb.        April 2019. Not currently working, though looking into going to school in the near future.        January 2020.  Just started at [employer] studying milling (precision metal work). Exercise is playing fritwidoxe with his dog.  They live in [place].        June 2020.  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 His husky dog is 1 year old and they run together.              Family History:     Family History   Problem Relation Age of Onset     Arthritis Mother         rheumatoid     Asthma Father      Allergies Father      Thyroid Disease Maternal Grandmother         hyper     Diabetes Other         type 1     C.A.D. Sister         prolonged QT; dysautonomia     Asthma Sister             Allergies:     Allergies   Allergen Reactions     Latex Other (See Comments)     SITE INFECTIONS             Medications:     Current Outpatient Rx   Medication Sig Dispense Refill     acetaminophen (TYLENOL) 500 MG tablet Take 1 tablet (500 mg) by mouth every 6 hours as needed 20 tablet 0     betamethasone valerate (VALISONE) 0.1 % ointment Apply topically 2 times daily 45 g 0     chlorhexidine (HIBICLENS) 4 % external liquid Use to clean off skin prior to pump site insertions. 120 mL 6     Continuous Blood Gluc Sensor (DEXCOM G6 SENSOR) MISC 1 each every 10 days 3 each 11     Continuous Blood Gluc Transmit (DEXCOM G6 TRANSMITTER) MISC 1 each every 3 months 1 each 3     CONTOUR NEXT TEST test  strip Use to test blood sugar 6 times daily or as directed. 550 each 3     ibuprofen (ADVIL,MOTRIN) 200 MG tablet Take 400 mg by mouth every 4 hours as needed        insulin glargine (LANTUS SOLOSTAR) 100 UNIT/ML injection Patient to use 32 Units once daily when off the pump 15 mL 11     insulin glargine (LANTUS SOLOSTAR) 100 UNIT/ML pen Use up to 50 units daily. 30 mL 11     insulin lispro (HUMALOG KWIKPEN) 100 UNIT/ML injection Use up to 50 units daily. 30 mL 3     insulin lispro (HUMALOG KWIKPEN) 100 UNIT/ML pen Use up to 50 units daily per MD instructions 30 mL 6     insulin lispro (HUMALOG) 100 UNIT/ML vial Patient uses up to 100 units per day via insulin pump. 90 mL 0     insulin pen needle (B-D U/F) 31G X 5 MM Use 6 time(s) a day. 200 each 3     Insulin Pen Needle 31G X 4 MM MISC 6 each daily 200 each 3     INSULIN PUMP ACCESSORIES MISC None Entered       ketone blood test (PRECISION XTRA KETONE) STRP Check blood ketones when two consecutive blood sugars are greater than 300 and/or at times of illness/vomiting. 20 each 11     melatonin 3 MG tablet Take 3 mg by mouth nightly as needed.       ondansetron (ZOFRAN) 4 MG tablet Take 1 tablet (4 mg) by mouth every 8 hours as needed for nausea 6 tablet 0     Pediatric Multivit-Minerals-C (FLINTSTONES SOUR GUMMIES PO) Take 1 chew tab by mouth daily       glucagon (GLUCAGON EMERGENCY) 1 MG injection Inject 1 mg into the muscle once for 1 dose For unconscious hypoglycemia only - dispense 2 kits (1 home and 1 school) 2 each 0             Review of Systems:     Comprehensive ROS negative other than the symptoms noted above in the HPI.          Physical Exam:     A complete physical exam was not performed due to covid-19 restrictions necessitating a video visit. By video, the patient appeared well, and was alert and interactive.  Speech was fluid and natural.  There was no shortness of breath.  The patient was active with normal range of motion observed.        Laboratory  results:     TSH   Date Value Ref Range Status   08/30/2018 2.05 0.40 - 4.00 mU/L Final     Tissue Transglutaminase Antibody IgA   Date Value Ref Range Status   08/30/2018 1 <7 U/mL Final     Comment:     Negative  The tTG-IgA assay has limited utility for patients with decreased levels of   IgA. Screening for celiac disease should include IgA testing to rule out   selective IgA deficiency and to guide selection and interpretation of   serological testing. tTG-IgG testing may be positive in celiac disease   patients with IgA deficiency.       Tissue Transglutaminase Poonam IgG   Date Value Ref Range Status   08/30/2018 <1 <7 U/mL Final     Comment:     Negative     Testosterone Total   Date Value Ref Range Status   12/04/2014 118 0 - 1,200 ng/dL Final     Cholesterol   Date Value Ref Range Status   08/30/2018 130 <170 mg/dL Final     Albumin Urine mg/L   Date Value Ref Range Status   01/23/2020 18 mg/L Final     Triglycerides   Date Value Ref Range Status   08/30/2018 107 (H) <90 mg/dL Final     Comment:     Borderline high:   mg/dl  High:            >129 mg/dl       HDL Cholesterol   Date Value Ref Range Status   08/30/2018 41 (L) >45 mg/dL Final     Comment:     Low:             <40 mg/dl  Borderline low:   40-45 mg/dl       LDL Cholesterol Calculated   Date Value Ref Range Status   08/30/2018 68 <110 mg/dL Final     Cholesterol/HDL Ratio   Date Value Ref Range Status   12/04/2014 2.3 0.0 - 5.0 Final     Non HDL Cholesterol   Date Value Ref Range Status   08/30/2018 89 <120 mg/dL Final     Lab Results   Component Value Date    A1C 7.9 01/23/2020    A1C 7.8 04/18/2019    A1C 8.9 11/15/2018    A1C 10.3 10/04/2018    A1C 10.3 10/04/2018      Lab Results   Component Value Date    HEMOGLOBINA1 10.1 09/15/2011    HEMOGLOBINA1 10.0 06/30/2011    HEMOGLOBINA1 9.9 03/10/2011    HEMOGLOBINA1 10.6 12/09/2010    HEMOGLOBINA1 11.0 10/14/2010             Diabetes Health Maintenance    Date of Diabetes Diagnosis:  2009, age  9  Type of Diabetes:  Type 1    Date Last Eye Exam and location: >1 year  Date Last Flu Shot (note if refused): January 2020  Date Last Annual Lab Studies---- 1/2020 urine, others 8/2018  Date of Last Visit:  1/23/2020           Assessment and Plan:   Oziel is a 20 year old male with type 1 diabetes. He has been having pump and sensor problems, but is hopeful that the new system will work better for him. The plan is discussed below in the patient instructions.    Diabetes is a complicated and dangerous illness which requires intensive monitoring and treatment to prevent both short-term and long-term consequences to various organs. Insulin therapy is life-saving, but is also associated with life-threatening toxicity (hypoglycemia).  Careful and continuous attention to balancing glucose levels, activity, diet and insulin dosage is necessary.    I have reviewed the data and the therapy plan with the patient, and with the diabetes nurse educator who will communicate with the patient between visits to adjust insulin as needed.      Patient Instructions       Thank you for choosing [facility].     Maurice Littlejohn MD    It was a pleasure talking to you today!    I'm sorry you have had so many problems with the pump and sensor. When you get trained on the new system, be sure to ask them about adhesive options. Your Lantus dose is too low based on the fact that you are on 38 units basal when you are on the pump. Under other circumstances I would jump directly up to 38 from 32 units, but because you are so physically active lets start out going up to 36.  Check a middle-of-night value. If the Lantus dose is right, you should stay steady all night. If it is too low, you will rise by morning, and if it is too high you will drop. Also why don't you take your Lantus dose an hour earlier each night until you get it down into the afternoon.  This is so that when you start the new pump (at the Lantus 24 hour  point), you won't be getting used to a new system at night.    Lets chat by video again in 2 weeks to make sure everything is going well with the new system. If you have any questions in the meantime don't hesitate to contact the nurses.      YOUR INSULIN DOSE IS:  When you are on Lantus:  Lantus 36 units  Humalog meal coverage 1 unit for every 6 grams  Humalog pre-meal correction 1 unit for every 30 above 120.    When you start your new insulin pump  Basal ('m going to start with the same basal rate around the clock)       12AM- 1.6 units per hour  Carb ratio-  1 unit per 6 grams carbohydrate  Correction (Sensitivity) Factor       12AM-  30  Targets  12AM 120-130  7AM 100-120  Active insulin 3 hours    We recommend checking blood sugars 4-6 times per day, every day or using a sensor  Goal blood sugars:   fasting,  pre-meal, <180 2 hours after a meal.  (Higher fasting and bedtime numbers may be targeted for children under 5 yearsof age.)    Follow up in 2 weeks.    SCREENING RELATIVES FOR TYPE 1 DIABETES  As we are all currently homebound, this is a perfect time for T1D family members to get capillary autoantibody screenings through [employer].  It is quick, easy and can be done from the comfort of home.    Why screen now?  Autoantibody positive relatives of people with T1D may be eligible for prevention trials (studies to stop or delay progression to clinical diabetes).  While our clinical trials are on hold right now, we hope to resume them this summer. Screening positive for autoantibodies right now puts subjects on a list for possible study inclusion once we are up and running again. There are a number of prevention and new onset studies ready to begin as soon as COVID-19 research restrictions are lifted.    Who is eligible to be screened?    Age 2.5 to 45 years and a sibling, offspring, or parent of an individual with type 1 diabetes    Age 2.5 to 20 years and a niece, nephew, aunt, uncle, grandchild,  cousin, or half sibling of an individual with type 1 diabetes  How does remote capillary screening work?     There is a [employer] screening website where you can sign-up, consent online, and request an at-home kit.    The website is [email or web address]     [employer] will mail you a kit including instructions and all the necessary materials.     The test requires about 10-12 drops of blood.     The kit includes instructions to ship the sample back via FedEx within 24 hours of collection. There is a number to arrange free home pick-up by [employer].    Sick Day Plan:  Pump Failure:  IF YOUR PUMP FAILS AND YOU NEED TO TAKE BASAL INSULIN (GLARGINE, BASAGLAR, TRESIBA, LEVEMIR) THE DOSE IS: 36 units  Remember when you restart your pump that the basal insulin lasts 24 hours so wait until 24 hours is up before starting your pump basal rate.Call on-call endocrinologist or diabetes nurse if this happens. You should also plan to call the pump company right away to troubleshoot the pump failure.    Hyperglycemia (high blood glucose):  Ketones:  Check urine/blood ketones if Oziel is sick, vomiting, or if blood glucose is above 240 twice in a row. Call on-call endocrinologist or diabetes nurse if ketones are present.    Hypoglycemia (low blood glucose):  If blood glucose is 60 to 80:  1.  Eat or drink 1 carb unit (15 grams carbohydrate).   One carb unit equals:   - 1/2 cup (4 ounces) juice or regular soda pop, or   - 1 cup (8 ounces) milk, or   - 3 to 4 glucose tablets  2.  Re-check your blood glucose in 15 minutes.  3.  Repeat these steps every 15 minutes until your blood glucose is above 100.    If blood glucose is under 60:  1.  Eat or drink 2 carb units (30 grams carbohydrate).  Two carb units equal:   - 1 cup (8 ounces) juice or regular soda pop, or   - 2 cups (16 ounces) milk, or   - 6 to 8 glucose tablets.  2.  Re-check your blood glucose in 15 minutes.  3.  Repeat these steps every 15 minutes until your blood  glucose is above 100.      If you had any blood work, imaging or other tests:  Normal test results will be mailed to your home address in a letter.  Abnormal results will be communicated to you via phone call / letter.  Please allow 2 weeks for processing/interpretation of most lab work.  For urgent issues that cannot wait until the next business day, call [phone] and ask for the Pediatric Endocrinologist on call.    You may contact the diabetes nurses with any questions at [phone].  Randi Dasilva RN, BSN; Elvia Navarro RN; or Nanda Lino RN, BAN may answer, depending on the day. Calls will be returned as soon as possible.      Medication renewal requests must be faxed to the main office by your pharmacy.  Allow 3-4 days for completion.   Main Office: [phone]  Fax: [phone]    Scheduling:    Pediatric Call Center for [facility], [phone]  [facility], 9th floor [phone]  Infusion Center: [phone] (for stimulation tests)  Radiology/ [phone]     Services:   [phone]     We encourage you to sign up for [employer] for easy communication with us.  Sign up at the clinic  or go to [email or web address].     Please try the [facility] to [facility] ([facility]) phone application for Virtual Tours, Procedure Preparation, Resources, Preparation for Hospital Stay and the Coloring Board.       Thank you for allowing me to participate in the care of your patient.  Please do not hesitate to call with questions or concerns.    Sincerely,    Farzana Littlejohn MD  Professor and   Pediatric Endocrinology  [facility]    CC  SELF, REFERRED

## 2020-06-25 ENCOUNTER — VIRTUAL VISIT (OUTPATIENT)
Dept: ENDOCRINOLOGY | Facility: CLINIC | Age: 20
End: 2020-06-25
Attending: PEDIATRICS
Payer: COMMERCIAL

## 2020-06-25 DIAGNOSIS — E10.65 TYPE 1 DIABETES MELLITUS WITH HYPERGLYCEMIA (H): Primary | ICD-10-CM

## 2020-06-25 NOTE — LETTER
"  6/25/2020      RE: Nicholas M Dubina  [address] [place] [id number]       Nicholas M Dubina is a 20 year old male who is being evaluated via a billable video visit.      The patient has been notified of following:     \"This video visit will be conducted via a call between you and your physician/provider. We have found that certain health care needs can be provided without the need for an in-person physical exam.  This service lets us provide the care you need with a video conversation.  If a prescription is necessary we can send it directly to your pharmacy.  If lab work is needed we can place an order for that and you can then stop by our lab to have the test done at a later time.    Video visits are billed at different rates depending on your insurance coverage.  Please reach out to your insurance provider with any questions.    If during the course of the call the physician/provider feels a video visit is not appropriate, you will not be charged for this service.\"    Patient has given verbal consent for Video visit? Yes    Will anyone else be joining your video visit? No        Video-Visit Details    Type of service:  Video Visit    Video Start Time: 3:44 PM  Video End Time: 4:07 PM    Originating Location (pt. Location): Home    Distant Location (provider location):  [email or web address]     Platform used for Video Visit: [email or web address]    Pediatric Endocrinology Return Consultation:  Diabetes Video Virtual Visit  :   Patient: Nicholas M Dubina MRN# [id number]   YOB: 2000 Age: 20 year old   Date of Visit: 6/25/2020  Dear  Referred Self:    I had the pleasure of visiting with your patient, Nicholas M Dubina on a video virtual visit from the Pediatric Endocrinology Clinic, [facility], on 6/25/2020 for a return consultation regarding type 1 diabetes .           Problem list:     Patient Active Problem List    Diagnosis Date Noted     Type 1 diabetes mellitus with " hyperglycemia (H) 02/04/2016     Priority: Medium     Emesis 12/15/2013     Priority: Medium     Irritability and anger 09/22/2009     Priority: Medium     Followed by Dr. Kam.  Initiating 504 plan.       Underweight 09/22/2009     Priority: Medium            HPI:   Oziel is a 20 year old male with Type 1 diabetes mellitus. I had a video visit with him and his mother.    I have reviewed the available past laboratory evaluations, imaging studies, and medical records available to me at this visit.    History was obtained from the patient and the medical record.    I independently reviewed and interpretted the blood glucose and pump downloads.     Overall average: 298 mg/dL, SD 36. However, this was just the one meter that he downloaded. He read off other numbers to me that were better.  These high numbers have been in the morning since he went off the pump and went back to Lantus. He went back to his previous Lantus dose, but that is less insulin that the pump was giving him as basal.     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 Thus, he has switched to shots.  Also he hasn't been wearing his Dexcom---it is not that it fails once he is wearing it, he can't seem to get it on.      He just got the new Medtronic system including the sensor, and is eager to get started on that and hoping he will have more success with it than with the Dexcom. Mom plans to call today to set up training.    Total insulin, units per day when on the pump: 38 (this was when he was only getting basal insulin and giving himself shots for bolus) -56 units (when he was bolusing through the pump.  He is on a relatively low carbohydrate diet.     Result was discussed at today's visit.     Current insulin / diabetes medication regimen:   When he is on Lantus (currently):  Lantus 32 units  Carb ratio 6  Correction 1 per 30 over 120    When he is on the pump:  Insulin pump:   Medtronic MiniMed 530. Dexcom G6 (not currently wearing sensor)  12 AM: 1.4  6 AM: 1.5  11AM: 1.7  4PM: 1.8  10PM: 1.6  Carb ratio:    12A: 6  6A: 6    11A: 6  Correction    12A 1:30  Targets  12A 100-150  7A   9P 100-150      A1c:  Previous two HbA1c results:   Lab Results   Component Value Date    A1C 7.9 2020    A1C 7.8 2019        Family history and social history were reviewed and updated from last visit.          Past Medical History:     Past Medical History:   Diagnosis Date     Diabetes mellitus, type 1 3/11/2009     Irritability and anger 2009    Followed by Dr. Kam.  Initiating 504 plan.     Transitory tachypnea of      Level 2 nursery x 30 hours     Underweight 2009     Unspecified fetal and  jaundice     Peak bilirubin = 18.0. Received phototherapy            Past Surgical History:     Past Surgical History:   Procedure Laterality Date     NO HISTORY OF SURGERY                 Social History:     Social History     Social History Narrative    7th grade (4101-8332)        Environmental History    Child is around people that smoke: No     Child's home has well water: Yes    Child's home has filtered water:No    Child has pets in the home: 2 dogs outside and 1 inside cat    Child's home/apartment was built before 1950:No    Child attends :     Child has exposure to sibling/playmate with lead poisoning: No    Child has exposure to someone with tuberculosis: No    Child home have guns/firearms without trigger locks: Yes    Child home have guns/firearms with trigger locks: No    There are concerns about the child's exposure to violence in the home: May be concerned about violence that comes from other kids (outside the home).                Parent #1    Name: Deborah A. Dubina, F, 66  Education: College   Occupation:     Parent #2    Name: Edward S. Dubina, M, 65  Education: College   Occupation: Homemaker         Relationship Status of Parent(s):     Who does-he child live with? mother and father    What language(s) is/are spoken at home? English         August 2018. Lives in [place] with his parents, his 4 sisters and his sister's boyfriend.  Just started at [employer]. Not a strong student. Interested in mechanical engineering.  Works part time at [employer]. Used to play hockey, no regular exercise now.        November 2018. 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 Eats massive amounts of protein, very little carb.        April 2019. Not currently working, though looking into going to school in the near future.        January 2020.  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 Exercise is playing frisbee with his dog.  They live in [place].        June 2020.  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 His husky dog is 1 year old and they run together.              Family History:     Family History   Problem Relation Age of Onset     Arthritis Mother         rheumatoid     Asthma Father      Allergies Father      Thyroid Disease Maternal Grandmother         hyper     Diabetes Other         type 1     C.A.D. Sister         prolonged QT; dysautonomia     Asthma Sister             Allergies:     Allergies   Allergen Reactions     Latex Other (See Comments)     SITE INFECTIONS             Medications:     Current Outpatient Rx   Medication Sig Dispense Refill     acetaminophen (TYLENOL) 500 MG tablet Take 1 tablet (500 mg) by mouth every 6 hours as needed 20 tablet 0     betamethasone valerate (VALISONE) 0.1 % ointment Apply topically 2 times daily 45 g 0     chlorhexidine (HIBICLENS) 4 % external liquid Use to clean off skin prior to pump site insertions. 120 mL 6     Continuous Blood Gluc Sensor (DEXCOM G6 SENSOR) MISC 1 each every 10 days 3 each 11     Continuous Blood Gluc Transmit  (DEXCOM G6 TRANSMITTER) MISC 1 each every 3 months 1 each 3     CONTOUR NEXT TEST test strip Use to test blood sugar 6 times daily or as directed. 550 each 3     ibuprofen (ADVIL,MOTRIN) 200 MG tablet Take 400 mg by mouth every 4 hours as needed        insulin glargine (LANTUS SOLOSTAR) 100 UNIT/ML injection Patient to use 32 Units once daily when off the pump 15 mL 11     insulin glargine (LANTUS SOLOSTAR) 100 UNIT/ML pen Use up to 50 units daily. 30 mL 11     insulin lispro (HUMALOG KWIKPEN) 100 UNIT/ML injection Use up to 50 units daily. 30 mL 3     insulin lispro (HUMALOG KWIKPEN) 100 UNIT/ML pen Use up to 50 units daily per MD instructions 30 mL 6     insulin lispro (HUMALOG) 100 UNIT/ML vial Patient uses up to 100 units per day via insulin pump. 90 mL 0     insulin pen needle (B-D U/F) 31G X 5 MM Use 6 time(s) a day. 200 each 3     Insulin Pen Needle 31G X 4 MM MISC 6 each daily 200 each 3     INSULIN PUMP ACCESSORIES MISC None Entered       ketone blood test (PRECISION XTRA KETONE) STRP Check blood ketones when two consecutive blood sugars are greater than 300 and/or at times of illness/vomiting. 20 each 11     melatonin 3 MG tablet Take 3 mg by mouth nightly as needed.       ondansetron (ZOFRAN) 4 MG tablet Take 1 tablet (4 mg) by mouth every 8 hours as needed for nausea 6 tablet 0     Pediatric Multivit-Minerals-C (FLINTSTONES SOUR GUMMIES PO) Take 1 chew tab by mouth daily       glucagon (GLUCAGON EMERGENCY) 1 MG injection Inject 1 mg into the muscle once for 1 dose For unconscious hypoglycemia only - dispense 2 kits (1 home and 1 school) 2 each 0             Review of Systems:     Comprehensive ROS negative other than the symptoms noted above in the HPI.          Physical Exam:     A complete physical exam was not performed due to covid-19 restrictions necessitating a video visit. By video, the patient appeared well, and was alert and interactive.  Speech was fluid and natural.  There was no shortness of  breath.  The patient was active with normal range of motion observed.        Laboratory results:     TSH   Date Value Ref Range Status   08/30/2018 2.05 0.40 - 4.00 mU/L Final     Tissue Transglutaminase Antibody IgA   Date Value Ref Range Status   08/30/2018 1 <7 U/mL Final     Comment:     Negative  The tTG-IgA assay has limited utility for patients with decreased levels of   IgA. Screening for celiac disease should include IgA testing to rule out   selective IgA deficiency and to guide selection and interpretation of   serological testing. tTG-IgG testing may be positive in celiac disease   patients with IgA deficiency.       Tissue Transglutaminase Poonam IgG   Date Value Ref Range Status   08/30/2018 <1 <7 U/mL Final     Comment:     Negative     Testosterone Total   Date Value Ref Range Status   12/04/2014 118 0 - 1,200 ng/dL Final     Cholesterol   Date Value Ref Range Status   08/30/2018 130 <170 mg/dL Final     Albumin Urine mg/L   Date Value Ref Range Status   01/23/2020 18 mg/L Final     Triglycerides   Date Value Ref Range Status   08/30/2018 107 (H) <90 mg/dL Final     Comment:     Borderline high:   mg/dl  High:            >129 mg/dl       HDL Cholesterol   Date Value Ref Range Status   08/30/2018 41 (L) >45 mg/dL Final     Comment:     Low:             <40 mg/dl  Borderline low:   40-45 mg/dl       LDL Cholesterol Calculated   Date Value Ref Range Status   08/30/2018 68 <110 mg/dL Final     Cholesterol/HDL Ratio   Date Value Ref Range Status   12/04/2014 2.3 0.0 - 5.0 Final     Non HDL Cholesterol   Date Value Ref Range Status   08/30/2018 89 <120 mg/dL Final     Lab Results   Component Value Date    A1C 7.9 01/23/2020    A1C 7.8 04/18/2019    A1C 8.9 11/15/2018    A1C 10.3 10/04/2018    A1C 10.3 10/04/2018      Lab Results   Component Value Date    HEMOGLOBINA1 10.1 09/15/2011    HEMOGLOBINA1 10.0 06/30/2011    HEMOGLOBINA1 9.9 03/10/2011    HEMOGLOBINA1 10.6 12/09/2010    HEMOGLOBINA1 11.0  10/14/2010             Diabetes Health Maintenance    Date of Diabetes Diagnosis:  2009, age 9  Type of Diabetes:  Type 1    Date Last Eye Exam and location: >1 year  Date Last Flu Shot (note if refused): January 2020  Date Last Annual Lab Studies---- 1/2020 urine, others 8/2018  Date of Last Visit:  1/23/2020           Assessment and Plan:   Oziel is a 20 year old male with type 1 diabetes. He has been having pump and sensor problems, but is hopeful that the new system will work better for him. The plan is discussed below in the patient instructions.    Diabetes is a complicated and dangerous illness which requires intensive monitoring and treatment to prevent both short-term and long-term consequences to various organs. Insulin therapy is life-saving, but is also associated with life-threatening toxicity (hypoglycemia).  Careful and continuous attention to balancing glucose levels, activity, diet and insulin dosage is necessary.    I have reviewed the data and the therapy plan with the patient, and with the diabetes nurse educator who will communicate with the patient between visits to adjust insulin as needed.      Patient Instructions       Thank you for choosing [facility].     Maurice Littlejohn MD    It was a pleasure talking to you today!    I'm sorry you have had so many problems with the pump and sensor. When you get trained on the new system, be sure to ask them about adhesive options. Your Lantus dose is too low based on the fact that you are on 38 units basal when you are on the pump. Under other circumstances I would jump directly up to 38 from 32 units, but because you are so physically active lets start out going up to 36.  Check a middle-of-night value. If the Lantus dose is right, you should stay steady all night. If it is too low, you will rise by morning, and if it is too high you will drop. Also why don't you take your Lantus dose an hour earlier each night until you get it down  into the afternoon.  This is so that when you start the new pump (at the Lantus 24 hour point), you won't be getting used to a new system at night.    Lets chat by video again in 2 weeks to make sure everything is going well with the new system. If you have any questions in the meantime don't hesitate to contact the nurses.      YOUR INSULIN DOSE IS:  When you are on Lantus:  Lantus 36 units  Humalog meal coverage 1 unit for every 6 grams  Humalog pre-meal correction 1 unit for every 30 above 120.    When you start your new insulin pump  Basal ('m going to start with the same basal rate around the clock)       12AM- 1.6 units per hour  Carb ratio-  1 unit per 6 grams carbohydrate  Correction (Sensitivity) Factor       12AM-  30  Targets  12AM 120-130  7AM 100-120  Active insulin 3 hours    We recommend checking blood sugars 4-6 times per day, every day or using a sensor  Goal blood sugars:   fasting,  pre-meal, <180 2 hours after a meal.  (Higher fasting and bedtime numbers may be targeted for children under 5 yearsof age.)    Follow up in 2 weeks.    SCREENING RELATIVES FOR TYPE 1 DIABETES  As we are all currently homebound, this is a perfect time for T1D family members to get capillary autoantibody screenings through [employer].  It is quick, easy and can be done from the comfort of home.    Why screen now?  Autoantibody positive relatives of people with T1D may be eligible for prevention trials (studies to stop or delay progression to clinical diabetes).  While our clinical trials are on hold right now, we hope to resume them this summer. Screening positive for autoantibodies right now puts subjects on a list for possible study inclusion once we are up and running again. There are a number of prevention and new onset studies ready to begin as soon as COVID-19 research restrictions are lifted.    Who is eligible to be screened?    Age 2.5 to 45 years and a sibling, offspring, or parent of an individual  with type 1 diabetes    Age 2.5 to 20 years and a niece, nephew, aunt, uncle, grandchild, cousin, or half sibling of an individual with type 1 diabetes  How does remote capillary screening work?     There is a [employer] screening website where you can sign-up, consent online, and request an at-home kit.    The website is [email or web address]     [employer] will mail you a kit including instructions and all the necessary materials.     The test requires about 10-12 drops of blood.     The kit includes instructions to ship the sample back via [employer] within 24 hours of collection. There is a number to arrange free home pick-up by [employer].    Sick Day Plan:  Pump Failure:  IF YOUR PUMP FAILS AND YOU NEED TO TAKE BASAL INSULIN (GLARGINE, BASAGLAR, TRESIBA, LEVEMIR) THE DOSE IS: 36 units  Remember when you restart your pump that the basal insulin lasts 24 hours so wait until 24 hours is up before starting your pump basal rate.Call on-call endocrinologist or diabetes nurse if this happens. You should also plan to call the pump company right away to troubleshoot the pump failure.    Hyperglycemia (high blood glucose):  Ketones:  Check urine/blood ketones if Oziel is sick, vomiting, or if blood glucose is above 240 twice in a row. Call on-call endocrinologist or diabetes nurse if ketones are present.    Hypoglycemia (low blood glucose):  If blood glucose is 60 to 80:  1.  Eat or drink 1 carb unit (15 grams carbohydrate).   One carb unit equals:   - 1/2 cup (4 ounces) juice or regular soda pop, or   - 1 cup (8 ounces) milk, or   - 3 to 4 glucose tablets  2.  Re-check your blood glucose in 15 minutes.  3.  Repeat these steps every 15 minutes until your blood glucose is above 100.    If blood glucose is under 60:  1.  Eat or drink 2 carb units (30 grams carbohydrate).  Two carb units equal:   - 1 cup (8 ounces) juice or regular soda pop, or   - 2 cups (16 ounces) milk, or   - 6 to 8 glucose tablets.  2.  Re-check your  blood glucose in 15 minutes.  3.  Repeat these steps every 15 minutes until your blood glucose is above 100.      If you had any blood work, imaging or other tests:  Normal test results will be mailed to your home address in a letter.  Abnormal results will be communicated to you via phone call / letter.  Please allow 2 weeks for processing/interpretation of most lab work.  For urgent issues that cannot wait until the next business day, call [phone] and ask for the Pediatric Endocrinologist on call.    You may contact the diabetes nurses with any questions at [phone].  Randi Dasilva, RN, BSN; Elvia Navarro, RN; or Nanda Lino RN, BAN may answer, depending on the day. Calls will be returned as soon as possible.      Medication renewal requests must be faxed to the main office by your pharmacy.  Allow 3-4 days for completion.   Main Office: [phone]  Fax: [phone]    Scheduling:    Pediatric Call Center for [facility], [phone]  [facility], 9th floor [phone]  Infusion Center: [phone] (for stimulation tests)  Radiology/ [phone]     Services:   [phone]     We encourage you to sign up for [employer] for easy communication with us.  Sign up at the clinic  or go to [email or web address].     Please try the [facility] to [facility] ([facility]) phone application for Virtual Tours, Procedure Preparation, Resources, Preparation for Hospital Stay and the Coloring Board.       Thank you for allowing me to participate in the care of your patient.  Please do not hesitate to call with questions or concerns.    Sincerely,    Farzana Littlejohn MD  Professor and   Pediatric Endocrinology  [facility]    CC  SELF, REFERRED         Farzana Littlejohn MD

## 2020-06-25 NOTE — PATIENT INSTRUCTIONS
Thank you for choosing [facility].     Maurice Littlejohn MD    It was a pleasure talking to you today!    I'm sorry you have had so many problems with the pump and sensor. When you get trained on the new system, be sure to ask them about adhesive options. Your Lantus dose is too low based on the fact that you are on 38 units basal when you are on the pump. Under other circumstances I would jump directly up to 38 from 32 units, but because you are so physically active lets start out going up to 36.  Check a middle-of-night value. If the Lantus dose is right, you should stay steady all night. If it is too low, you will rise by morning, and if it is too high you will drop. Also why don't you take your Lantus dose an hour earlier each night until you get it down into the afternoon.  This is so that when you start the new pump (at the Lantus 24 hour point), you won't be getting used to a new system at night.    Lets chat by video again in 2 weeks to make sure everything is going well with the new system. If you have any questions in the meantime don't hesitate to contact the nurses.      YOUR INSULIN DOSE IS:  When you are on Lantus:  Lantus 36 units  Humalog meal coverage 1 unit for every 6 grams  Humalog pre-meal correction 1 unit for every 30 above 120.    When you start your new insulin pump  Basal ('m going to start with the same basal rate around the clock)       12AM- 1.6 units per hour  Carb ratio-  1 unit per 6 grams carbohydrate  Correction (Sensitivity) Factor       12AM-  30  Targets  12AM 120-130  7AM 100-120  Active insulin 3 hours    We recommend checking blood sugars 4-6 times per day, every day or using a sensor  Goal blood sugars:   fasting,  pre-meal, <180 2 hours after a meal.  (Higher fasting and bedtime numbers may be targeted for children under 5 yearsof age.)    Follow up in 2 weeks.    SCREENING RELATIVES FOR TYPE 1 DIABETES  As we are all currently homebound, this is  a perfect time for T1D family members to get capillary autoantibody screenings through [employer].  It is quick, easy and can be done from the comfort of home.    Why screen now?  Autoantibody positive relatives of people with T1D may be eligible for prevention trials (studies to stop or delay progression to clinical diabetes).  While our clinical trials are on hold right now, we hope to resume them this summer. Screening positive for autoantibodies right now puts subjects on a list for possible study inclusion once we are up and running again. There are a number of prevention and new onset studies ready to begin as soon as COVID-19 research restrictions are lifted.    Who is eligible to be screened?    Age 2.5 to 45 years and a sibling, offspring, or parent of an individual with type 1 diabetes    Age 2.5 to 20 years and a niece, nephew, aunt, uncle, grandchild, cousin, or half sibling of an individual with type 1 diabetes  How does remote capillary screening work?     There is a [employer] screening website where you can sign-up, consent online, and request an at-home kit.    The website is [email or web address]     [employer] will mail you a kit including instructions and all the necessary materials.     The test requires about 10-12 drops of blood.     The kit includes instructions to ship the sample back via imoji within 24 hours of collection. There is a number to arrange free home pick-up by imoji.    Sick Day Plan:  Pump Failure:  IF YOUR PUMP FAILS AND YOU NEED TO TAKE BASAL INSULIN (GLARGINE, BASAGLAR, TRESIBA, LEVEMIR) THE DOSE IS: 36 units  Remember when you restart your pump that the basal insulin lasts 24 hours so wait until 24 hours is up before starting your pump basal rate.Call on-call endocrinologist or diabetes nurse if this happens. You should also plan to call the pump company right away to troubleshoot the pump failure.    Hyperglycemia (high blood glucose):  Ketones:  Check urine/blood  ketones if Oziel is sick, vomiting, or if blood glucose is above 240 twice in a row. Call on-call endocrinologist or diabetes nurse if ketones are present.    Hypoglycemia (low blood glucose):  If blood glucose is 60 to 80:  1.  Eat or drink 1 carb unit (15 grams carbohydrate).   One carb unit equals:   - 1/2 cup (4 ounces) juice or regular soda pop, or   - 1 cup (8 ounces) milk, or   - 3 to 4 glucose tablets  2.  Re-check your blood glucose in 15 minutes.  3.  Repeat these steps every 15 minutes until your blood glucose is above 100.    If blood glucose is under 60:  1.  Eat or drink 2 carb units (30 grams carbohydrate).  Two carb units equal:   - 1 cup (8 ounces) juice or regular soda pop, or   - 2 cups (16 ounces) milk, or   - 6 to 8 glucose tablets.  2.  Re-check your blood glucose in 15 minutes.  3.  Repeat these steps every 15 minutes until your blood glucose is above 100.      If you had any blood work, imaging or other tests:  Normal test results will be mailed to your home address in a letter.  Abnormal results will be communicated to you via phone call / letter.  Please allow 2 weeks for processing/interpretation of most lab work.  For urgent issues that cannot wait until the next business day, call [phone] and ask for the Pediatric Endocrinologist on call.    You may contact the diabetes nurses with any questions at [phone].  Randi Dasilva RN, BSN; Elvia Navarro RN; or Nanda Lino RN, BAN may answer, depending on the day. Calls will be returned as soon as possible.      Medication renewal requests must be faxed to the main office by your pharmacy.  Allow 3-4 days for completion.   Main Office: [phone]  Fax: [phone]    Scheduling:    Pediatric Call Center for [facility] and [facility], [phone]  [facility], 9th floor [phone]  Infusion Center: [phone] (for stimulation tests)  Radiology/ [phone]     Services:   [phone]     Joao  encourage you to sign up for [employer] for easy communication with us.  Sign up at the clinic  or go to [email or web address].     Please try the [facility] ([facility]) phone application for Virtual Tours, Procedure Preparation, Resources, Preparation for Hospital Stay and the Coloring Board.

## 2020-06-25 NOTE — NURSING NOTE
"Nicholas M Dubina is a 20 year old male who is being evaluated via a billable video visit.      The patient has been notified of following:     \"This video visit will be conducted via a call between you and your physician/provider. We have found that certain health care needs can be provided without the need for an in-person physical exam.  This service lets us provide the care you need with a video conversation.  If a prescription is necessary we can send it directly to your pharmacy.  If lab work is needed we can place an order for that and you can then stop by our lab to have the test done at a later time.    Video visits are billed at different rates depending on your insurance coverage.  Please reach out to your insurance provider with any questions.    If during the course of the call the physician/provider feels a video visit is not appropriate, you will not be charged for this service.\"    Patient has given verbal consent for Video visit? Yes    Will anyone else be joining your video visit? No        Jenna Asencio LPN        "

## 2020-06-26 DIAGNOSIS — E10.65 TYPE 1 DIABETES MELLITUS WITH HYPERGLYCEMIA (H): ICD-10-CM

## 2020-11-22 ENCOUNTER — HEALTH MAINTENANCE LETTER (OUTPATIENT)
Age: 20
End: 2020-11-22

## 2020-12-03 ENCOUNTER — TELEPHONE (OUTPATIENT)
Dept: ENDOCRINOLOGY | Facility: CLINIC | Age: 20
End: 2020-12-03

## 2020-12-03 DIAGNOSIS — E10.65 TYPE 1 DIABETES MELLITUS WITH HYPERGLYCEMIA (H): ICD-10-CM

## 2020-12-03 RX ORDER — INSULIN LISPRO 100 [IU]/ML
INJECTION, SOLUTION INTRAVENOUS; SUBCUTANEOUS
Qty: 60 ML | Refills: 3 | Status: SHIPPED | OUTPATIENT
Start: 2020-12-03 | End: 2020-12-18

## 2020-12-03 RX ORDER — FLURBIPROFEN SODIUM 0.3 MG/ML
SOLUTION/ DROPS OPHTHALMIC
Qty: 200 EACH | Refills: 3 | Status: SHIPPED | OUTPATIENT
Start: 2020-12-03

## 2020-12-03 RX ORDER — BLOOD KETONE TEST, STRIPS
STRIP MISCELLANEOUS
Qty: 60 EACH | Refills: 3 | Status: SHIPPED | OUTPATIENT
Start: 2020-12-03 | End: 2020-12-23

## 2020-12-03 RX ORDER — GLUCAGON INJECTION, SOLUTION 1 MG/.2ML
1 INJECTION, SOLUTION SUBCUTANEOUS PRN
Qty: 2 SYRINGE | Refills: 3 | Status: SHIPPED | OUTPATIENT
Start: 2020-12-03 | End: 2020-12-18

## 2020-12-03 NOTE — TELEPHONE ENCOUNTER
Nanda Reed   Thu 12/3/2020 1:44 PM     To:   Deborah Dubina <[email or web address]>  Santos Fox,   Good to talk with you over the phone. I have sent the prescriptions to your new pharmacy. Here are the last doses we have for Francisco. See you virtually in a few weeks!     When you are on Lantus:  Lantus 36 units  Humalog meal coverage 1 unit for every 6 grams  Humalog pre-meal correction 1 unit for every 30 above 120.    When you start your new insulin pump  Basal ('m going to start with the same basal?rate?around the clock)  ?????12AM- 1.6 units per hour  Carb ratio- 1 unit per 6 grams carbohydrate  Correction (Sensitivity) Factor  ?????12AM- ?30  Targets  12AM?120-130  7AM?100-120  Active insulin 3 hours    Nanda Reed, RN   Nurse Care Coordinator, Pediatric Diabetes   [facility]   Ph: [phone]  Fax: [phone]   ?   From: Deborah Dubina <[email or web address]>  Sent: Thursday, December 3, 2020 1:10 PM  To: Nanda Reed [email or web address]>  Subject: Re: Nick Dubina Hi Melissa I have made a virtual appointment for Francisco and ANTWON with dr. Littlejohn on 12/17 at 1230. I did update our insurance information.   The medications we have on prescription right now are   Humalog Vial   Humalog Pen   Lantus Pen   Contour next test strips   BD Rosa Ultra Fine Pen Needles   Also we should get a refill on the Test strips for ketones. He uses the blood strip test.   We currently do not have a glucagon pen and not sure if we should have one or not?   Our insurance is [employer] and the pharmacy part is [facility] We can do everything through 90 mail order Their phone Number is [phone]   Fax number is [phone]   I will call "[employer] to update our insurance there as well. They usually send into Dr. Littlejohn's office anything they need for prescription.   Can you send me Francisco's pump settings he had so we can make sure we enter  the right ones into his pump. He has been on shots since early June and I don't want to mess this up for him.   I appreciate all the help you are giving us. We have been through a very rough time and are trying to get back out of the stress.   Let me know if I need to give you anything else.   Dominique       On Thu, Dec 3, 2020 at 8:38 AM Nanda Reed <[email or web address]> wrote:  Santos Fox,   Thank you for your email. I am sorry to hear this has been a stressful time for your family. Thanks for letting us know what has been going on.   Let's get Francisco on Dr. Littlejohn's schedule. She is seeing patients virtually at this time. She has one more day open in December on 12/17. The best thing to do would be to call our call center to get this scheduled, as they can also help update his insurance information. Please call [phone].   Let me know once you've done this, and then I can send in update prescriptions and get the Prior Authorization for Humalog started. Can you please send a list of all the meds you need refilled (including specific brand for test strips and long acting insulin if needed). Let me know which pharmacy to send these to.   Thank you!  Nanda Reed, RN   Nurse Care Coordinator, Pediatric Diabetes   [facility]   Ph: [phone]  Fax: [phone]     From: Deborah Dubina <[email or web address]>  Sent: Wednesday, December 2, 2020 3:51 PM  To: Nanda Reed <[email or web address]>  Subject: Nick Dubina Hi Melissa,   I wanted to reach out and give some history of the last few months here for our family. Both Ed and I lost our jobs, I did at the end of June and my  Ed at the end of July. We had to take Cobra for insurance but the set up for this was a huge hassle. We had to have our  look over the package they gave my  before we signed anything, and setting the Cobra up took almost a month  before [employer] viewed us as being covered, so even though we had coverage, anything being billed was being denied because we were not set up in the system. Anyway, given all of this and what is going on in the world now, we had quite a lot of stress in the house. It was horrible and everyone including the kids were feeling this stress. At this time I did not schedule Francisco's appointment. I took a job finally at beginning of November and am getting settled. Ed is still out of work. I have benefits set up now through my work and have to get everything changed here at the office for Francisco. So here is what I need to do:   First set up an appointment with Francisco and Dr. Littlejohn   Second get information updated at appointment.   Third get all his prescriptions written through new insurance and through the new 90 day mail order pharmacy plan.   With the prescriptions, I did find out that Humalog is not a covered medication through them. The insurance is [employer] Blue [employer] and the prescription program mail order is through [employer]. [email or web address] is sending a coverage acceptance form to Dr. Littlejohn office for you guys to fill out for the humalog. When Francisco first started they had him on Novolog and the Humalog worked much better for him. So We would karyna to stay on this.   Fourth we did get a new medtronic pump and glucose monitor and we are going to start this setup. He worked all summer and stayed on the shots because of the sweating issue.   So I wanted to apologize for the long absence. Francisco's health is important and I feel bad we went through this. We are still trying to recover and get things somewhat back to normal.   So maybe if you want to call me we can discuss the best way to go about getting everything taken care of that would be great or you can respond to the email too, whatever works for you.   I hope you and your family are doing well.   Thanks for the help I appreciate it   Deb Dubina   [phone]      --   Have a great day,   Deb Dubina    --   Have a great day,   Deb Dubina

## 2020-12-03 NOTE — TELEPHONE ENCOUNTER
Prior Authorization Retail Medication Request    Medication/Dose: Humalog  ICD code (if different than what is on RX):  E10.65  Previously Tried and Failed:  Novolog  Rationale:  Oziel tried and failed using Novolog insulin, he saw erratic and blood sugars while using Novolog. Using Humalog will help ensure the best control of his blood sugar and the safest management of his diabetes care.     Insurance Name:  [facility] [place] of [place]  Insurance ID:  [id number]      Pharmacy Information (if different than what is on RX)  Name:    Phone:

## 2020-12-03 NOTE — TELEPHONE ENCOUNTER
PA Initiation    Medication: Humalog U100 Vial -   Insurance Company: [employer] - Phone [phone] Fax [phone]  Pharmacy Filling the Rx: [id number] - [place], [place] - [address] [place] [address]  Filling Pharmacy Phone: [phone]  Filling Pharmacy Fax: [phone]  Start Date: 12/3/2020

## 2020-12-07 NOTE — TELEPHONE ENCOUNTER
Per [facility] pharmacy pt doesn't have an active Rx for Humalog Vials  Please send script to pts pharmacy    Prior Authorization Not Needed per Insurance    Medication: Humalog U100 Vial - Not needed  Insurance Company: [employer] - Phone [phone] Fax [phone]  Expected CoPay:      Ph[id number] [place] [address]  Pharmacy Notified: YesComment:  Per [facility] pharmacy pt doesn't have an active Rx for Humalog Vials  Patient Notified:

## 2020-12-16 NOTE — PROGRESS NOTES
"The patient is being evaluated via a billable video visit.      The parent/guardian has been notified of following: \"This video visit will be conducted via a call between you, your child, and your child's physician/provider. We have found that certain health care needs can be provided without the need for an in-person physical exam.  This service lets us provide the care you need with a video conversation.  If a prescription is necessary we can send it directly to your pharmacy.  If lab work is needed we can place an order for that and you can then stop by our lab to have the test done at a later time. Video visits are billed at different rates depending on your insurance coverage.  Please reach out to your insurance provider with any questions.If during the course of the call the physician/provider feels a video visit is not appropriate, you will not be charged for this service.\"    Parent/guardian has given verbal consent for Video visit? YES  How would you like to obtain your AVS?: Mail    Video-Visit Details  Type of service:  Video Visit    Video Start Time: 12:30  Video End Time:  12:49    Originating Location (pt. Location): Home  Distant Location (provider location):  [facility]   Platform used for Video Visit: [facility]    Pediatric Endocrinology Return Consultation:  Diabetes Video Virtual Visit  :   Patient: Nicholas M Dubina MRN# [id number]   YOB: 2000 Age: 20 year old   Date of Visit: 12/17/2020  Dear Dr. Farzana Littlejohn:    I had the pleasure of visiting with your patient, Nicholas M Dubina on a video virtual visit from the Pediatric Endocrinology Clinic, [facility], on 12/17/2020 for a return consultation regarding type 1 diabetes .           Problem list:     Patient Active Problem List    Diagnosis Date Noted     Type 1 diabetes mellitus with hyperglycemia (H) 02/04/2016     Priority: Medium     Emesis 12/15/2013     Priority: Medium     " Irritability and anger 09/22/2009     Priority: Medium     Followed by Dr. Kam.  Initiating 504 plan.       Underweight 09/22/2009     Priority: Medium            HPI:   Oziel is a 20 year old male with Type 1 diabetes mellitus. I had a video visit with him and his mother.    I have reviewed the available past laboratory evaluations, imaging studies, and medical records available to me at this visit.    History was obtained from the patient and the medical record.    I independently reviewed and interpretted the blood glucose downloads.      TODAY'S CONCERNS  Flu shot?---He does not want one. Says he throws up for a week any time he gets one.  Due for annual studies  Eye exam?--will wait until next summer because of covid surge in the community  Why is he not using his pump and sensor?--He has ordered the 770 from [email or web address] and would like to start it.  I haven't seen him since June    He was 45 Tuesday am when he woke up--why?--He is actually low most nights, wakes up in the middle of the night and eats but doesn't test.    BLOOD GLUCOSE TRENDS RECOGNIZED  Only testing 0-2 times a day and when he does his glucose levels are very erratic, ranging from .        Overall average: 181 mg/dL, . BG checks/day: 1.6 .    Result was discussed at today's visit.     Current insulin / diabetes medication regimen:   When you are on Lantus:  Lantus 38 units  Humalog meal coverage 1 unit for every 8 grams  Humalog pre-meal correction 1 unit for every 30 above 120.     When you start your new insulin pump  Basal  -----1.6 units per hour  Carb ratio- 1 unit per 6 grams carbohydrate  Correction (Sensitivity) Factor 30  Targets  -----[id number]  -----7AM?100-120  Active insulin 3 hours    A1c:  Previous two HbA1c results:   Lab Results   Component Value Date    A1C 7.9 01/23/2020    A1C 7.8 04/18/2019        Family history and social history were reviewed and updated from last visit.          Past Medical History:      Past Medical History:   Diagnosis Date     Diabetes mellitus, type 1 3/11/2009     Irritability and anger 2009    Followed by Dr. Kam.  Initiating 504 plan.     Transitory tachypnea of      Level 2 nursery x 30 hours     Underweight 2009     Unspecified fetal and  jaundice     Peak bilirubin = 18.0. Received phototherapy            Past Surgical History:     Past Surgical History:   Procedure Laterality Date     NO HISTORY OF SURGERY                 Social History:     Social History     Social History Narrative    7th grade (1501-8194)        Environmental History    Child is around people that smoke: No     Child's home has well water: Yes    Child's home has filtered water:No    Child has pets in the home: 2 dogs outside and 1 inside cat    Child's home/apartment was built before 1950:No    Child attends :     Child has exposure to sibling/playmate with lead poisoning: No    Child has exposure to someone with tuberculosis: No    Child home have guns/firearms without trigger locks: Yes    Child home have guns/firearms with trigger locks: No    There are concerns about the child's exposure to violence in the home: May be concerned about violence that comes from other kids (outside the home).                Parent #1    Name: Deborah A. Dubina, ERIC, 66  Education: College   Occupation:     Parent #2    Name: Edward S. Dubina, M, 65  Education: College   Occupation: Homemaker        Relationship Status of Parent(s):     Who does-he child live with? mother and father    What language(s) is/are spoken at home? English         2018. Lives in [place] with his parents, his 4 sisters and his sister's boyfriend.  Just started at [employer]. Interested in mechanical engineering.  Works part time at [employer]. Used to play hockey, no regular exercise now.        2018. 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 Eats a high protein diet, very little carb.        April 2019. Not currently working, though looking into going to school in the near future.        January 2020.  Just started at [employer] studying milling (precision metal work). Exercises by playing frisbee with his dog.  They live in [place].        June 2020.  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 His husky dog is 1 year old and they run together.              Family History:     Family History   Problem Relation Age of Onset     Arthritis Mother         rheumatoid     Asthma Father      Allergies Father      Thyroid Disease Maternal Grandmother         hyper     Diabetes Other         type 1     C.A.D. Sister         prolonged QT; dysautonomia     Asthma Sister             Allergies:     Allergies   Allergen Reactions     Latex Other (See Comments)     SITE INFECTIONS             Medications:     Current Outpatient Rx   Medication Sig Dispense Refill     acetaminophen (TYLENOL) 500 MG tablet Take 1 tablet (500 mg) by mouth every 6 hours as needed 20 tablet 0     betamethasone valerate (VALISONE) 0.1 % ointment Apply topically 2 times daily 45 g 0     blood glucose monitoring (MEG MICROLET) lancets Use to test blood sugar 6 times daily or as directed. 600 each 3     chlorhexidine (HIBICLENS) 4 % external liquid Use to clean off skin prior to pump site insertions. 120 mL 6     Continuous Blood Gluc Sensor (DEXCOM G6 SENSOR) MISC 1 each every 10 days 3 each 11     Continuous Blood Gluc Transmit (DEXCOM G6 TRANSMITTER) MISC 1 each every 3 months 1 each 3     CONTOUR NEXT TEST test strip Use to test blood sugar 6 times daily or as directed. 550 each 3     Glucagon (GVOKE PFS) 1 MG/0.2ML SOSY Inject 1 mg Subcutaneous as needed (Hypoglycemic seizure or unconsciousness) 2 Syringe 3     ibuprofen (ADVIL,MOTRIN) 200 MG tablet Take 400 mg by mouth every 4 hours as needed         insulin glargine (LANTUS SOLOSTAR) 100 UNIT/ML pen Use up to 50 units per day 60 mL 3     insulin lispro (HUMALOG KWIKPEN) 100 UNIT/ML (1 unit dial) KWIKPEN Use up to 60 units per day when off of insulin pump 60 mL 3     insulin lispro (HUMALOG) 100 UNIT/ML vial Patient uses up to 100 units per day via insulin pump. 90 mL 3     insulin pen needle (B-D U/F) 31G X 5 MM miscellaneous Use 6 time(s) a day. 200 each 3     INSULIN PUMP ACCESSORIES MISC None Entered       ketone blood test (PRECISION XTRA KETONE) STRP Check blood ketones when two consecutive blood sugars are greater than 300 and/or at times of illness/vomiting. 60 each 3     melatonin 3 MG tablet Take 3 mg by mouth nightly as needed.       ondansetron (ZOFRAN) 4 MG tablet Take 1 tablet (4 mg) by mouth every 8 hours as needed for nausea 6 tablet 0     Pediatric Multivit-Minerals-C (FLINTSTONES SOUR GUMMIES PO) Take 1 chew tab by mouth daily               Review of Systems:     Comprehensive ROS negative other than the symptoms noted above in the HPI.          Physical Exam:     A complete physical exam was not performed due to covid-19 restrictions necessitating a video visit. By video, the patient appeared well, and was alert and interactive.  Speech was fluid and natural.  There was no shortness of breath.  The patient was active with normal range of motion observed.        Laboratory results:     TSH   Date Value Ref Range Status   08/30/2018 2.05 0.40 - 4.00 mU/L Final     Tissue Transglutaminase Antibody IgA   Date Value Ref Range Status   08/30/2018 1 <7 U/mL Final     Comment:     Negative  The tTG-IgA assay has limited utility for patients with decreased levels of   IgA. Screening for celiac disease should include IgA testing to rule out   selective IgA deficiency and to guide selection and interpretation of   serological testing. tTG-IgG testing may be positive in celiac disease   patients with IgA deficiency.       Tissue Transglutaminase Poonam IgG    Date Value Ref Range Status   08/30/2018 <1 <7 U/mL Final     Comment:     Negative     Testosterone Total   Date Value Ref Range Status   12/04/2014 118 0 - 1,200 ng/dL Final     Cholesterol   Date Value Ref Range Status   08/30/2018 130 <170 mg/dL Final     Albumin Urine mg/L   Date Value Ref Range Status   01/23/2020 18 mg/L Final     Triglycerides   Date Value Ref Range Status   08/30/2018 107 (H) <90 mg/dL Final     Comment:     Borderline high:   mg/dl  High:            >129 mg/dl       HDL Cholesterol   Date Value Ref Range Status   08/30/2018 41 (L) >45 mg/dL Final     Comment:     Low:             <40 mg/dl  Borderline low:   40-45 mg/dl       LDL Cholesterol Calculated   Date Value Ref Range Status   08/30/2018 68 <110 mg/dL Final     Cholesterol/HDL Ratio   Date Value Ref Range Status   12/04/2014 2.3 0.0 - 5.0 Final     Non HDL Cholesterol   Date Value Ref Range Status   08/30/2018 89 <120 mg/dL Final     Lab Results   Component Value Date    A1C 7.9 01/23/2020    A1C 7.8 04/18/2019    A1C 8.9 11/15/2018    A1C 10.3 10/04/2018    A1C 10.3 10/04/2018      Lab Results   Component Value Date    HEMOGLOBINA1 10.1 09/15/2011    HEMOGLOBINA1 10.0 06/30/2011    HEMOGLOBINA1 9.9 03/10/2011    HEMOGLOBINA1 10.6 12/09/2010    HEMOGLOBINA1 11.0 10/14/2010             Diabetes Health Maintenance    Date of Diabetes Diagnosis:  2009, age 9  Type of Diabetes:  Type 1    Episodes of DKA?   Episodes of severe (required help) hypoglycemia?  Date Last Eye Exam and location: >1 year  Date Last Flu Shot (note if refused): January 2020, refused Dec 2020  Date Last Annual Lab Studies---- 1/2020 urine, others 8/2018  Date of Last Visit:  6/25/2020         Assessment and Plan:   Oziel is a 20 year old male with uncontrolled diabetes. He is haven't problems primarilly with hypoglycemia, especially at night. He is willing to go back on the pump and sensor. He is not willing to have a flu shot. The plan is discussed  below in the patient instructions.    Diabetes is a complicated and dangerous illness which requires intensive monitoring and treatment to prevent both short-term and long-term consequences to various organs. Insulin therapy is life-saving, but is also associated with life-threatening toxicity (hypoglycemia).  Careful and continuous attention to balancing glucose levels, activity, diet and insulin dosage is necessary.    I have reviewed the data and the therapy plan with the patient, and with the diabetes nurse educator who will communicate with the patient between visits to adjust insulin as needed.      Patient Instructions            Thank you for choosing [facility].     Maurice Littlejohn MD    It was a pleasure talking to you today. This visit note is available to you in clinovot. If you see any errors or changes/additions you would like me to make to the note please let me know.    I'm sorry to hear you are having so much hypoglycemia. I am going to cut back on your Lantus from 38 to 34 units.  If after this you are still having nighttime lows, cut back to 30 units.     Please make an appointment with the nurses to get started on your new pump and sensor. I am listing the settings below (but if you have cut back to 30 on your Lantus let them know and they will lower the basal).    When you come in you can get your annual studies. I have put in the order.    I'd like to see you back for a virtual visit in 1 month.    YOUR INSULIN DOSE IS:  When you are on Lantus:  Lantus 34 units (from 38)  Humalog meal coverage 1 unit for every 8 grams  Humalog pre-meal correction 1 unit for every 30 above 120.     When you start your new insulin [employer] 770 pump  Basal  -----1.4 units per hour  Carb ratio- 1 unit per 8 grams carbohydrate  Correction (Sensitivity) Factor 30  Targets  -----[id number]  -----7AM?  Active insulin 2 hours    We recommend checking blood sugars 4-6 times per day, every day or  using a sensor  Goal blood sugars:   fasting,  pre-meal, <180 2 hours after a meal.  (Higher fasting and bedtime numbers may be targeted for children under 5 yearsof age.)    Follow up in 1 month.    SCREENING RELATIVES FOR TYPE 1 DIABETES  As we are all currently homebound, this is a perfect time for T1D family members to get capillary autoantibody screenings through [employer].  It is quick, easy and can be done from the comfort of home.    Why screen now?  Autoantibody positive relatives of people with T1D may be eligible for prevention trials (studies to stop or delay progression to clinical diabetes).  While our clinical trials are on hold right now, we hope to resume them this summer. Screening positive for autoantibodies right now puts subjects on a list for possible study inclusion once we are up and running again. There are a number of prevention and new onset studies ready to begin as soon as COVID-19 research restrictions are lifted.    Who is eligible to be screened?    Age 2.5 to 45 years and a sibling, offspring, or parent of an individual with type 1 diabetes    Age 2.5 to 20 years and a niece, nephew, aunt, uncle, grandchild, cousin, or half sibling of an individual with type 1 diabetes  How does remote capillary screening work?     There is a [employer] screening website where you can sign-up, consent online, and request an at-home kit.    The website is [email or web address]     [employer] will mail you a kit including instructions and all the necessary materials.     The test requires about 10-12 drops of blood.     The kit includes instructions to ship the sample back via [employer] within 24 hours of collection. There is a number to arrange free home pick-up by [employer].    Sick Day Plan:  Hyperglycemia (high blood glucose):  Ketones:  Check urine/blood ketones if Oziel is sick, vomiting, or if blood glucose is above 240 twice in a row. Call on-call endocrinologist or diabetes nurse  if ketones are present.    Hypoglycemia (low blood glucose):  If blood glucose is 60 to 80:  1.  Eat or drink 1 carb unit (15 grams carbohydrate).   One carb unit equals:   - 1/2 cup (4 ounces) juice or regular soda pop, or   - 1 cup (8 ounces) milk, or   - 3 to 4 glucose tablets  2.  Re-check your blood glucose in 15 minutes.  3.  Repeat these steps every 15 minutes until your blood glucose is above 100.    If blood glucose is under 60:  1.  Eat or drink 2 carb units (30 grams carbohydrate).  Two carb units equal:   - 1 cup (8 ounces) juice or regular soda pop, or   - 2 cups (16 ounces) milk, or   - 6 to 8 glucose tablets.  2.  Re-check your blood glucose in 15 minutes.  3.  Repeat these steps every 15 minutes until your blood glucose is above 100.      If you had any blood work, imaging or other tests:  Normal test results will be mailed to your home address in a letter.  Abnormal results will be communicated to you via phone call / letter.  Please allow 2 weeks for processing/interpretation of most lab work.  For urgent issues that cannot wait until the next business day, call [phone] and ask for the Pediatric Endocrinologist on call.    You may contact the diabetes nurses with any questions at [phone].  Randi Dasilva RN, BSN; Elvia Navarro RN; or Nanda Lino RN, BAN may answer, depending on the day. Calls will be returned as soon as possible.      Medication renewal requests must be faxed to the main office by your pharmacy.  Allow 3-4 days for completion.   Main Office: [phone]  Fax: [phone]    Scheduling:    Pediatric Call Center for [facility], [phone]  [facility], 9th floor [phone]  Infusion Center: [phone] (for stimulation tests)  Radiology/ [phone]     Services:   [phone]     We encourage you to sign up for [employer] for easy communication with us.  Sign up at the clinic  or go to [email or web address].     Please  try the [employer] to [facility] ([facility]) phone application for Virtual Tours, Procedure Preparation, Resources, Preparation for Hospital Stay and the Coloring Board.                Thank you for allowing me to participate in the care of your patient.  Please do not hesitate to call with questions or concerns.    Sincerely,    Farzana Littlejohn MD  Professor and   Pediatric Endocrinology  [facility]    CC  FARZANA LITTLEJOHN

## 2020-12-17 ENCOUNTER — VIRTUAL VISIT (OUTPATIENT)
Dept: ENDOCRINOLOGY | Facility: CLINIC | Age: 20
End: 2020-12-17
Attending: PEDIATRICS
Payer: COMMERCIAL

## 2020-12-17 DIAGNOSIS — E10.65 TYPE 1 DIABETES MELLITUS WITH HYPERGLYCEMIA (H): Primary | ICD-10-CM

## 2020-12-17 PROCEDURE — 99215 OFFICE O/P EST HI 40 MIN: CPT | Mod: 95 | Performed by: PEDIATRICS

## 2020-12-17 NOTE — LETTER
"  12/17/2020      RE: Nicholas M Dubina  [address] [place] [id number]       The patient is being evaluated via a billable video visit.      The parent/guardian has been notified of following: \"This video visit will be conducted via a call between you, your child, and your child's physician/provider. We have found that certain health care needs can be provided without the need for an in-person physical exam.  This service lets us provide the care you need with a video conversation.  If a prescription is necessary we can send it directly to your pharmacy.  If lab work is needed we can place an order for that and you can then stop by our lab to have the test done at a later time. Video visits are billed at different rates depending on your insurance coverage.  Please reach out to your insurance provider with any questions.If during the course of the call the physician/provider feels a video visit is not appropriate, you will not be charged for this service.\"    Parent/guardian has given verbal consent for Video visit? YES  How would you like to obtain your AVS?: Mail    Video-Visit Details  Type of service:  Video Visit    Video Start Time: 12:30  Video End Time:  12:49    Originating Location (pt. Location): Home  Distant Location (provider location):  [facility]   Platform used for Video Visit: [employer]    Pediatric Endocrinology Return Consultation:  Diabetes Video Virtual Visit  :   Patient: Nicholas M Dubina MRN# [id number]   YOB: 2000 Age: 20 year old   Date of Visit: 12/17/2020  Dear Dr. Farzana Littlejohn:    I had the pleasure of visiting with your patient, Nicholas M Dubina on a video virtual visit from the Pediatric Endocrinology Clinic, [facility], on 12/17/2020 for a return consultation regarding type 1 diabetes .           Problem list:     Patient Active Problem List    Diagnosis Date Noted     Type 1 diabetes mellitus with hyperglycemia (H) " 02/04/2016     Priority: Medium     Emesis 12/15/2013     Priority: Medium     Irritability and anger 09/22/2009     Priority: Medium     Followed by Dr. Kam.  Initiating 504 plan.       Underweight 09/22/2009     Priority: Medium            HPI:   Oziel is a 20 year old male with Type 1 diabetes mellitus. I had a video visit with him and his mother.    I have reviewed the available past laboratory evaluations, imaging studies, and medical records available to me at this visit.    History was obtained from the patient and the medical record.    I independently reviewed and interpretted the blood glucose downloads.      TODAY'S CONCERNS  Flu shot?---He does not want one. Says he throws up for a week any time he gets one.  Due for annual studies  Eye exam?--will wait until next summer because of covid surge in the community  Why is he not using his pump and sensor?--He has ordered the 770 from [employer] and would like to start it.  I haven't seen him since June    He was 45 Tuesday am when he woke up--why?--He is actually low most nights, wakes up in the middle of the night and eats but doesn't test.    BLOOD GLUCOSE TRENDS RECOGNIZED  Only testing 0-2 times a day and when he does his glucose levels are very erratic, ranging from .        Overall average: 181 mg/dL, . BG checks/day: 1.6 .    Result was discussed at today's visit.     Current insulin / diabetes medication regimen:   When you are on Lantus:  Lantus 38 units  Humalog meal coverage 1 unit for every 8 grams  Humalog pre-meal correction 1 unit for every 30 above 120.     When you start your new insulin pump  Basal  -----1.6 units per hour  Carb ratio- 1 unit per 6 grams carbohydrate  Correction (Sensitivity) Factor 30  Targets  -----[id number]  -----7AM?100-120  Active insulin 3 hours    A1c:  Previous two HbA1c results:   Lab Results   Component Value Date    A1C 7.9 01/23/2020    A1C 7.8 04/18/2019        Family history and social  history were reviewed and updated from last visit.          Past Medical History:     Past Medical History:   Diagnosis Date     Diabetes mellitus, type 1 3/11/2009     Irritability and anger 2009    Followed by Dr. Kam.  Initiating 504 plan.     Transitory tachypnea of      Level 2 nursery x 30 hours     Underweight 2009     Unspecified fetal and  jaundice     Peak bilirubin = 18.0. Received phototherapy            Past Surgical History:     Past Surgical History:   Procedure Laterality Date     NO HISTORY OF SURGERY                 Social History:     Social History     Social History Narrative    7th grade ([id number])        Environmental History    Child is around people that smoke: No     Child's home has well water: Yes    Child's home has filtered water:No    Child has pets in the home: 2 dogs outside and 1 inside cat    Child's home/apartment was built before 1950:No    Child attends :     Child has exposure to sibling/playmate with lead poisoning: No    Child has exposure to someone with tuberculosis: No    Child home have guns/firearms without trigger locks: Yes    Child home have guns/firearms with trigger locks: No    There are concerns about the child's exposure to violence in the home: May be concerned about violence that comes from other kids (outside the home).                Parent #1    Name: Deborah A. Dubina, F, 66  Education: College   Occupation:     Parent #2    Name: Edward S. Dubina, M, 65  Education: College   Occupation: Homemaker        Relationship Status of Parent(s):     Who does-he child live with? mother and father    What language(s) is/are spoken at home? English         2018. Lives in [place] with his parents, his 4 sisters and his sister's boyfriend.  Just started at [employer]. Interested in [employer] engineering.  Works part time at [employer]. Used to play hockey, no  regular exercise now.        November 2018. 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 Eats a high protein diet, very little carb.        April 2019. Not currently working, though looking into going to school in the near future.        January 2020.  Just started at [employer] studying milling (precision metal work). Exercises by playing frisbee with his dog.  They live in [place].        June 2020.  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 His husky dog is 1 year old and they run together.              Family History:     Family History   Problem Relation Age of Onset     Arthritis Mother         rheumatoid     Asthma Father      Allergies Father      Thyroid Disease Maternal Grandmother         hyper     Diabetes Other         type 1     C.A.D. Sister         prolonged QT; dysautonomia     Asthma Sister             Allergies:     Allergies   Allergen Reactions     Latex Other (See Comments)     SITE INFECTIONS             Medications:     Current Outpatient Rx   Medication Sig Dispense Refill     acetaminophen (TYLENOL) 500 MG tablet Take 1 tablet (500 mg) by mouth every 6 hours as needed 20 tablet 0     betamethasone valerate (VALISONE) 0.1 % ointment Apply topically 2 times daily 45 g 0     blood glucose monitoring (MEG MICROLET) lancets Use to test blood sugar 6 times daily or as directed. 600 each 3     chlorhexidine (HIBICLENS) 4 % external liquid Use to clean off skin prior to pump site insertions. 120 mL 6     Continuous Blood Gluc Sensor (DEXCOM G6 SENSOR) MISC 1 each every 10 days 3 each 11     Continuous Blood Gluc Transmit (DEXCOM G6 TRANSMITTER) MISC 1 each every 3 months 1 each 3     CONTOUR NEXT TEST test strip Use to test blood sugar 6 times daily or as directed. 550 each 3     Glucagon (GVOKE PFS) 1 MG/0.2ML SOSY Inject 1 mg Subcutaneous as needed (Hypoglycemic seizure or unconsciousness) 2 Syringe 3      ibuprofen (ADVIL,MOTRIN) 200 MG tablet Take 400 mg by mouth every 4 hours as needed        insulin glargine (LANTUS SOLOSTAR) 100 UNIT/ML pen Use up to 50 units per day 60 mL 3     insulin lispro (HUMALOG KWIKPEN) 100 UNIT/ML (1 unit dial) KWIKPEN Use up to 60 units per day when off of insulin pump 60 mL 3     insulin lispro (HUMALOG) 100 UNIT/ML vial Patient uses up to 100 units per day via insulin pump. 90 mL 3     insulin pen needle (B-D U/F) 31G X 5 MM miscellaneous Use 6 time(s) a day. 200 each 3     INSULIN PUMP ACCESSORIES MISC None Entered       ketone blood test (PRECISION XTRA KETONE) STRP Check blood ketones when two consecutive blood sugars are greater than 300 and/or at times of illness/vomiting. 60 each 3     melatonin 3 MG tablet Take 3 mg by mouth nightly as needed.       ondansetron (ZOFRAN) 4 MG tablet Take 1 tablet (4 mg) by mouth every 8 hours as needed for nausea 6 tablet 0     Pediatric Multivit-Minerals-C (FLINTSTONES SOUR GUMMIES PO) Take 1 chew tab by mouth daily               Review of Systems:     Comprehensive ROS negative other than the symptoms noted above in the HPI.          Physical Exam:     A complete physical exam was not performed due to covid-19 restrictions necessitating a video visit. By video, the patient appeared well, and was alert and interactive.  Speech was fluid and natural.  There was no shortness of breath.  The patient was active with normal range of motion observed.        Laboratory results:     TSH   Date Value Ref Range Status   08/30/2018 2.05 0.40 - 4.00 mU/L Final     Tissue Transglutaminase Antibody IgA   Date Value Ref Range Status   08/30/2018 1 <7 U/mL Final     Comment:     Negative  The tTG-IgA assay has limited utility for patients with decreased levels of   IgA. Screening for celiac disease should include IgA testing to rule out   selective IgA deficiency and to guide selection and interpretation of   serological testing. tTG-IgG testing may be  positive in celiac disease   patients with IgA deficiency.       Tissue Transglutaminase Poonam IgG   Date Value Ref Range Status   08/30/2018 <1 <7 U/mL Final     Comment:     Negative     Testosterone Total   Date Value Ref Range Status   12/04/2014 118 0 - 1,200 ng/dL Final     Cholesterol   Date Value Ref Range Status   08/30/2018 130 <170 mg/dL Final     Albumin Urine mg/L   Date Value Ref Range Status   01/23/2020 18 mg/L Final     Triglycerides   Date Value Ref Range Status   08/30/2018 107 (H) <90 mg/dL Final     Comment:     Borderline high:   mg/dl  High:            >129 mg/dl       HDL Cholesterol   Date Value Ref Range Status   08/30/2018 41 (L) >45 mg/dL Final     Comment:     Low:             <40 mg/dl  Borderline low:   40-45 mg/dl       LDL Cholesterol Calculated   Date Value Ref Range Status   08/30/2018 68 <110 mg/dL Final     Cholesterol/HDL Ratio   Date Value Ref Range Status   12/04/2014 2.3 0.0 - 5.0 Final     Non HDL Cholesterol   Date Value Ref Range Status   08/30/2018 89 <120 mg/dL Final     Lab Results   Component Value Date    A1C 7.9 01/23/2020    A1C 7.8 04/18/2019    A1C 8.9 11/15/2018    A1C 10.3 10/04/2018    A1C 10.3 10/04/2018      Lab Results   Component Value Date    HEMOGLOBINA1 10.1 09/15/2011    HEMOGLOBINA1 10.0 06/30/2011    HEMOGLOBINA1 9.9 03/10/2011    HEMOGLOBINA1 10.6 12/09/2010    HEMOGLOBINA1 11.0 10/14/2010             Diabetes Health Maintenance    Date of Diabetes Diagnosis:  2009, age 9  Type of Diabetes:  Type 1    Episodes of DKA?   Episodes of severe (required help) hypoglycemia?  Date Last Eye Exam and location: >1 year  Date Last Flu Shot (note if refused): January 2020, refused Dec 2020  Date Last Annual Lab Studies---- 1/2020 urine, others 8/2018  Date of Last Visit:  6/25/2020         Assessment and Plan:   Oziel is a 20 year old male with uncontrolled diabetes. He is haven't problems primarilly with hypoglycemia, especially at night. He is willing  to go back on the pump and sensor. He is not willing to have a flu shot. The plan is discussed below in the patient instructions.    Diabetes is a complicated and dangerous illness which requires intensive monitoring and treatment to prevent both short-term and long-term consequences to various organs. Insulin therapy is life-saving, but is also associated with life-threatening toxicity (hypoglycemia).  Careful and continuous attention to balancing glucose levels, activity, diet and insulin dosage is necessary.    I have reviewed the data and the therapy plan with the patient, and with the diabetes nurse educator who will communicate with the patient between visits to adjust insulin as needed.      Patient Instructions            Thank you for choosing [facility].     Maurice Littlejohn MD    It was a pleasure talking to you today. This visit note is available to you in [employer]. If you see any errors or changes/additions you would like me to make to the note please let me know.    I'm sorry to hear you are having so much hypoglycemia. I am going to cut back on your Lantus from 38 to 34 units.  If after this you are still having nighttime lows, cut back to 30 units.     Please make an appointment with the nurses to get started on your new pump and sensor. I am listing the settings below (but if you have cut back to 30 on your Lantus let them know and they will lower the basal).    When you come in you can get your annual studies. I have put in the order.    I'd like to see you back for a virtual visit in 1 month.    YOUR INSULIN DOSE IS:  When you are on Lantus:  Lantus 34 units (from 38)  Humalog meal coverage 1 unit for every 8 grams  Humalog pre-meal correction 1 unit for every 30 above 120.     When you start your new insulin [employer] 770 pump  Basal  -----1.4 units per hour  Carb ratio- 1 unit per 8 grams carbohydrate  Correction (Sensitivity) Factor  30  Targets  -----[id number]  -----7AM?  Active insulin 2 hours    We recommend checking blood sugars 4-6 times per day, every day or using a sensor  Goal blood sugars:   fasting,  pre-meal, <180 2 hours after a meal.  (Higher fasting and bedtime numbers may be targeted for children under 5 yearsof age.)    Follow up in 1 month.    SCREENING RELATIVES FOR TYPE 1 DIABETES  As we are all currently homebound, this is a perfect time for T1D family members to get capillary autoantibody screenings through [employer].  It is quick, easy and can be done from the comfort of home.    Why screen now?  Autoantibody positive relatives of people with T1D may be eligible for prevention trials (studies to stop or delay progression to clinical diabetes).  While our clinical trials are on hold right now, we hope to resume them this summer. Screening positive for autoantibodies right now puts subjects on a list for possible study inclusion once we are up and running again. There are a number of prevention and new onset studies ready to begin as soon as COVID-19 research restrictions are lifted.    Who is eligible to be screened?    Age 2.5 to 45 years and a sibling, offspring, or parent of an individual with type 1 diabetes    Age 2.5 to 20 years and a niece, nephew, aunt, uncle, grandchild, cousin, or half sibling of an individual with type 1 diabetes  How does remote capillary screening work?     There is a [employer] screening website where you can sign-up, consent online, and request an at-home kit.    The website is [email or web address]     [facility] will mail you a kit including instructions and all the necessary materials.     The test requires about 10-12 drops of blood.     The kit includes instructions to ship the sample back via [employer] within 24 hours of collection. There is a number to arrange free home pick-up by [employer].    Sick Day Plan:  Hyperglycemia (high blood glucose):  Ketones:  Check  urine/blood ketones if Oziel is sick, vomiting, or if blood glucose is above 240 twice in a row. Call on-call endocrinologist or diabetes nurse if ketones are present.    Hypoglycemia (low blood glucose):  If blood glucose is 60 to 80:  1.  Eat or drink 1 carb unit (15 grams carbohydrate).   One carb unit equals:   - 1/2 cup (4 ounces) juice or regular soda pop, or   - 1 cup (8 ounces) milk, or   - 3 to 4 glucose tablets  2.  Re-check your blood glucose in 15 minutes.  3.  Repeat these steps every 15 minutes until your blood glucose is above 100.    If blood glucose is under 60:  1.  Eat or drink 2 carb units (30 grams carbohydrate).  Two carb units equal:   - 1 cup (8 ounces) juice or regular soda pop, or   - 2 cups (16 ounces) milk, or   - 6 to 8 glucose tablets.  2.  Re-check your blood glucose in 15 minutes.  3.  Repeat these steps every 15 minutes until your blood glucose is above 100.      If you had any blood work, imaging or other tests:  Normal test results will be mailed to your home address in a letter.  Abnormal results will be communicated to you via phone call / letter.  Please allow 2 weeks for processing/interpretation of most lab work.  For urgent issues that cannot wait until the next business day, call [phone] and ask for the Pediatric Endocrinologist on call.    You may contact the diabetes nurses with any questions at [phone].  Randi Dasilva RN, BSN; Elvia Navarro RN; or Nanda Lino RN, BAN may answer, depending on the day. Calls will be returned as soon as possible.      Medication renewal requests must be faxed to the main office by your pharmacy.  Allow 3-4 days for completion.   Main Office: [phone]  Fax: [phone]    Scheduling:    Pediatric Call Center for [facility], [phone]  [facility], 9th floor [phone]  Infusion Center: [phone] (for stimulation tests)  Radiology/ [phone]     Services:    "[phone]     We encourage you to sign up for xoompark for easy communication with us.  Sign up at the clinic  or go to [email or web address].     Please try the Passport to [facility]) phone application for Virtual Tours, Procedure Preparation, Resources, Preparation for Hospital Stay and the Coloring Board.                Thank you for allowing me to participate in the care of your patient.  Please do not hesitate to call with questions or concerns.    Sincerely,    Farzana Littlejohn MD  Professor and   Pediatric Endocrinology  [facility]    BLAYNE  FARZANA LITTLEJOHN    Nicholas M Dubina is a 20 year old male who is being evaluated via a billable video visit.      The patient has been notified of following:     \"This video visit will be conducted via a call between you and your physician/provider. We have found that certain health care needs can be provided without the need for an in-person physical exam.  This service lets us provide the care you need with a video conversation.  If a prescription is necessary we can send it directly to your pharmacy.  If lab work is needed we can place an order for that and you can then stop by our lab to have the test done at a later time.    Video visits are billed at different rates depending on your insurance coverage.  Please reach out to your insurance provider with any questions.    If during the course of the call the physician/provider feels a video visit is not appropriate, you will not be charged for this service.\"    Patient has given verbal consent for Video visit? Yes  How would you like to obtain your AVS? [employer]  If you are dropped from the video visit, the video invite should be resent to: Send to e-mail at: [email or web address]  Will anyone else be joining your video visit? No        [email or web address]      Farzana Littlejohn MD  "

## 2020-12-17 NOTE — PROGRESS NOTES
"Nicholas M Dubina is a 20 year old male who is being evaluated via a billable video visit.      The patient has been notified of following:     \"This video visit will be conducted via a call between you and your physician/provider. We have found that certain health care needs can be provided without the need for an in-person physical exam.  This service lets us provide the care you need with a video conversation.  If a prescription is necessary we can send it directly to your pharmacy.  If lab work is needed we can place an order for that and you can then stop by our lab to have the test done at a later time.    Video visits are billed at different rates depending on your insurance coverage.  Please reach out to your insurance provider with any questions.    If during the course of the call the physician/provider feels a video visit is not appropriate, you will not be charged for this service.\"    Patient has given verbal consent for Video visit? Yes  How would you like to obtain your AVS? MyChart  If you are dropped from the video visit, the video invite should be resent to: Send to e-mail at: [email or web address]  Will anyone else be joining your video visit? No        [email or web address]  "

## 2020-12-17 NOTE — PATIENT INSTRUCTIONS
Thank you for choosing [facility].     Maurice Littlejohn MD    It was a pleasure talking to you today. This visit note is available to you in [employer]. If you see any errors or changes/additions you would like me to make to the note please let me know.    I'm sorry to hear you are having so much hypoglycemia. I am going to cut back on your Lantus from 38 to 34 units.  If after this you are still having nighttime lows, cut back to 30 units.     Please make an appointment with the nurses to get started on your new pump and sensor. I am listing the settings below (but if you have cut back to 30 on your Lantus let them know and they will lower the basal).    When you come in you can get your annual studies. I have put in the order.    I'd like to see you back for a virtual visit in 1 month.    YOUR INSULIN DOSE IS:  When you are on Lantus:  Lantus 34 units (from 38)  Humalog meal coverage 1 unit for every 8 grams  Humalog pre-meal correction 1 unit for every 30 above 120.     When you start your new insulin Medtronic 770 pump  Basal  -----1.4 units per hour  Carb ratio- 1 unit per 8 grams carbohydrate  Correction (Sensitivity) Factor 30  Targets  -----[id number]  -----7AM?  Active insulin 2 hours    We recommend checking blood sugars 4-6 times per day, every day or using a sensor  Goal blood sugars:   fasting,  pre-meal, <180 2 hours after a meal.  (Higher fasting and bedtime numbers may be targeted for children under 5 yearsof age.)    Follow up in 1 month.    SCREENING RELATIVES FOR TYPE 1 DIABETES  As we are all currently homebound, this is a perfect time for T1D family members to get capillary autoantibody screenings through [employer].  It is quick, easy and can be done from the comfort of home.    Why screen now?  Autoantibody positive relatives of people with T1D may be eligible for prevention trials (studies to stop or delay progression to clinical diabetes).  While our  clinical trials are on hold right now, we hope to resume them this summer. Screening positive for autoantibodies right now puts subjects on a list for possible study inclusion once we are up and running again. There are a number of prevention and new onset studies ready to begin as soon as COVID-19 research restrictions are lifted.    Who is eligible to be screened?    Age 2.5 to 45 years and a sibling, offspring, or parent of an individual with type 1 diabetes    Age 2.5 to 20 years and a niece, nephew, aunt, uncle, grandchild, cousin, or half sibling of an individual with type 1 diabetes  How does remote capillary screening work?     There is a [employer] screening website where you can sign-up, consent online, and request an at-home kit.    The website is [email or web address]     [employer] will mail you a kit including instructions and all the necessary materials.     The test requires about 10-12 drops of blood.     The kit includes instructions to ship the sample back via [employer] within 24 hours of collection. There is a number to arrange free home pick-up by [employer].    Sick Day Plan:  Hyperglycemia (high blood glucose):  Ketones:  Check urine/blood ketones if Oziel is sick, vomiting, or if blood glucose is above 240 twice in a row. Call on-call endocrinologist or diabetes nurse if ketones are present.    Hypoglycemia (low blood glucose):  If blood glucose is 60 to 80:  1.  Eat or drink 1 carb unit (15 grams carbohydrate).   One carb unit equals:   - 1/2 cup (4 ounces) juice or regular soda pop, or   - 1 cup (8 ounces) milk, or   - 3 to 4 glucose tablets  2.  Re-check your blood glucose in 15 minutes.  3.  Repeat these steps every 15 minutes until your blood glucose is above 100.    If blood glucose is under 60:  1.  Eat or drink 2 carb units (30 grams carbohydrate).  Two carb units equal:   - 1 cup (8 ounces) juice or regular soda pop, or   - 2 cups (16 ounces) milk, or   - 6 to 8 glucose tablets.  2.   Re-check your blood glucose in 15 minutes.  3.  Repeat these steps every 15 minutes until your blood glucose is above 100.      If you had any blood work, imaging or other tests:  Normal test results will be mailed to your home address in a letter.  Abnormal results will be communicated to you via phone call / letter.  Please allow 2 weeks for processing/interpretation of most lab work.  For urgent issues that cannot wait until the next business day, call [phone] and ask for the Pediatric Endocrinologist on call.    You may contact the diabetes nurses with any questions at [phone].  Randi Dasilva, RN, BSN; Elvia Navarro RN; or Nanda Lino RN, BAN may answer, depending on the day. Calls will be returned as soon as possible.      Medication renewal requests must be faxed to the main office by your pharmacy.  Allow 3-4 days for completion.   Main Office: [phone]  Fax: [phone]    Scheduling:    Pediatric Call Center for [facility], [phone]  [facility], 9th floor [phone]  Infusion Center: [phone] (for stimulation tests)  Radiology/ [phone]     Services:   [phone]     We encourage you to sign up for [employer] for easy communication with us.  Sign up at the clinic  or go to [email or web address].     Please try the Passport to [facility] ([facility]) phone application for Virtual Tours, Procedure Preparation, Resources, Preparation for Hospital Stay and the Coloring Board.

## 2020-12-18 ENCOUNTER — TELEPHONE (OUTPATIENT)
Dept: ENDOCRINOLOGY | Facility: CLINIC | Age: 20
End: 2020-12-18

## 2020-12-18 DIAGNOSIS — E10.65 TYPE 1 DIABETES MELLITUS WITH HYPERGLYCEMIA (H): ICD-10-CM

## 2020-12-18 RX ORDER — GLUCAGON INJECTION, SOLUTION 1 MG/.2ML
1 INJECTION, SOLUTION SUBCUTANEOUS PRN
Qty: 2 SYRINGE | Refills: 3 | Status: SHIPPED | OUTPATIENT
Start: 2020-12-18

## 2020-12-18 RX ORDER — INSULIN LISPRO 100 [IU]/ML
INJECTION, SOLUTION INTRAVENOUS; SUBCUTANEOUS
Qty: 60 ML | Refills: 3 | Status: SHIPPED | OUTPATIENT
Start: 2020-12-18 | End: 2022-04-21

## 2020-12-18 NOTE — TELEPHONE ENCOUNTER
Prior Authorization Retail Medication Request    Medication/Dose: Humalog Pen  ICD code (if different than what is on RX):  E10.65  Previously Tried and Failed:  Novolog  Rationale:  Oziel tried and failed using Novolog insulin, he saw erratic and blood sugars while using Novolog. Using Humalog will help ensure the best control of his blood sugar and the safest management of his diabetes care.    Insurance Name:    Insurance ID:        Pharmacy Information (if different than what is on RX)  Name:    Phone:      [facility] Key: [id number]

## 2020-12-21 NOTE — TELEPHONE ENCOUNTER
Central Prior Authorization Team   Phone: [phone]      PA Initiation    Medication: Humalog Pen-PA initiated  Insurance Company: [employer] - Phone [phone] Fax [phone]  Pharmacy Filling the Rx: [facility] - [place] - [address] AT [facility]  Filling Pharmacy Phone: [phone]  Filling Pharmacy Fax:    Start Date: 12/21/2020

## 2020-12-22 ENCOUNTER — TELEPHONE (OUTPATIENT)
Dept: ENDOCRINOLOGY | Facility: CLINIC | Age: 20
End: 2020-12-22

## 2020-12-22 NOTE — TELEPHONE ENCOUNTER
Prior Authorization Not Needed per Insurance-Per Mom, this was [employer] insurance which is no longer in effect. Of Note, the Key provided in the request is not valid.    Medication: Humalog Pen-PA Not Needed  Insurance Company: [employer] - Phone [phone] Fax [phone]  Expected CoPay:      Pharmacy Filling the Rx: [facility] - [place] - [address] AT [facility]  Pharmacy Notified: No  Patient Notified: Yes-Mom states patient now has ins through [employer] of [place]. [place] was updated to reflect. She also stated they will cover lispro, which I explained is the generic Humalog. She will contact pharmacy to attempt to set up order for lispro and will contact me directly if she has any issues with insurance.

## 2020-12-22 NOTE — TELEPHONE ENCOUNTER
Per request from mom, josefa CAMPOS for Humalog Kwikpen (used for pump failure). She emailed the blank form to me. Sent to [facility] via [employer]    Central Prior Authorization Team   Phone: [phone]      PA Initiation    Medication: insulin lispro (HUMALOG KWIKPEN) 100 UNIT/ML (1 unit dial) KWIKPEN-PA initiated  Insurance Company: [facility] - Phone [phone] Fax [phone]  Pharmacy Filling the Rx: [facility] - [place], [place] - [address] AT [facility]  Filling Pharmacy Phone: [phone]  Filling Pharmacy Fax:    Start Date: 12/22/2020

## 2020-12-22 NOTE — TELEPHONE ENCOUNTER
Per request from momjosefa for ketone (blood) test strips. She emailed the blank form to me.    Central Prior Authorization Team   Phone: [phone]      PA Initiation-Sent via [employer]    Medication: ketone blood test (PRECISION XTRA KETONE) STRP-PA initiated  Insurance Company: [employer] [place] - Phone [phone] Fax [phone]  Pharmacy Filling the Rx: [facility] - [place], [place] - [address] AT [facility]  Filling Pharmacy Phone: [phone]  Filling Pharmacy Fax:    Start Date: 12/22/2020

## 2020-12-23 DIAGNOSIS — E10.65 TYPE 1 DIABETES MELLITUS WITH HYPERGLYCEMIA (H): ICD-10-CM

## 2020-12-23 RX ORDER — BLOOD KETONE TEST, STRIPS
STRIP MISCELLANEOUS
Qty: 60 EACH | Refills: 3 | Status: SHIPPED | OUTPATIENT
Start: 2020-12-23

## 2020-12-23 NOTE — TELEPHONE ENCOUNTER
PRIOR AUTHORIZATION DENIED    Medication: insulin lispro (HUMALOG KWIKPEN) 100 UNIT/ML (1 unit dial) KWIKPEN-PA denied    Denial Date: 12/22/2020    Denial Rational: Must try/fail [employer]           Appeal Information:

## 2020-12-23 NOTE — TELEPHONE ENCOUNTER
PRIOR AUTHORIZATION DENIED    Medication: ketone blood test (PRECISION XTRA KETONE) STRP-PA denied    Denial Date: 12/22/2020    Denial Rational: Excluded      Appeal Information:

## 2021-02-10 NOTE — PROGRESS NOTES
"The patient is being evaluated via a billable video visit.      The parent/guardian has been notified of following: \"This video visit will be conducted via a call between you, your child, and your child's physician/provider. We have found that certain health care needs can be provided without the need for an in-person physical exam.  This service lets us provide the care you need with a video conversation.  If a prescription is necessary we can send it directly to your pharmacy.  If lab work is needed we can place an order for that and you can then stop by our lab to have the test done at a later time. Video visits are billed at different rates depending on your insurance coverage.  Please reach out to your insurance provider with any questions.If during the course of the call the physician/provider feels a video visit is not appropriate, you will not be charged for this service.\"    Parent/guardian has given verbal consent for Video visit? YES  How would you like to obtain your AVS?: Mail    Video-Visit Details  Type of service:  Video Visit    Video Start Time: 2:30  Video End Time: 2:55 (ended by phone after video link went bad    Originating Location (pt. Location): Home  Distant Location (provider location):  [email or web address]   Platform used for Video Visit: [employer]    Pediatric Endocrinology Return Consultation:  Diabetes Video Virtual Visit  :   Patient: Nicholas M Dubina MRN# [id number]   YOB: 2000 Age: 20 year old   Date of Visit: 2/11/2021  Dear Dr. Henning Warsaw C*:    I had the pleasure of visiting with your patient, Nicholas M Dubina on a video virtual visit from the Pediatric Endocrinology Clinic, [facility], on 2/11/2021 for a return consultation regarding type 1 diabetes.           Problem list:     Patient Active Problem List    Diagnosis Date Noted     Type 1 diabetes mellitus with hyperglycemia (H) 02/04/2016     Priority: Medium     " Emesis 12/15/2013     Priority: Medium     Irritability and anger 09/22/2009     Priority: Medium     Followed by Dr. Kam.  Initiating 504 plan.       Underweight 09/22/2009     Priority: Medium            HPI:   Oziel is a 20 year old male with Type 1 diabetes mellitus. I had a video visit with Francisco and his mother which turned into a telephone visit after our connection went bad.    I have reviewed the available past laboratory evaluations, imaging studies, and medical records available to me at this visit.    History was obtained from the patient and the medical record.    I independently reviewed and interpretted the blood glucose numbers.      TODAY'S CONCERNS  1.  Due for annual studies (ordered).  2.  He has a new pump and was supposed to come in for training, hasn't happened but he would like to get going on this and has been in contact with [employer] to set this up..  3. He read his pump for me over the phone, not a lot of numbers.  4.  Most common time to test is 3-4am. Sometimes he stays up until then and sometime he goes to bed earlier and gets up then.    SOCIAL DETERMINANTS OF HEALTH IMPACTING HEALTH MANAGEMENT  He has had 2 failed or cancelled appointment since I saw him in December and 8 cancelled/failed appointments before that.    INTERPRETATION OF DIABETES TESTS   am noon pm hs 3am   11-Feb        10-Feb     180   9-Feb     205   8-Feb        7-Feb   110     6-Feb    217 495   5-Feb   64       The 495 was a day he forgot his Lantus. He says he almost never forgets injections.    Result was discussed at today's visit.     Current insulin / diabetes medication regimen:   When you are on Lantus:  Lantus 34 units   Humalog meal coverage 1 unit for every 8 grams  Humalog pre-meal correction 1 unit for every 30 above 120.     When you start your new insulin Medtronic 770 pump  Basal  -----1.3 units per hour  Carb ratio- 1 unit per 8 grams carbohydrate  Correction (Sensitivity) Factor  30  Targets  -----midnight 120-130  -----7AM?  Active insulin 2 hours    A1c:  Previous two HbA1c results:   Lab Results   Component Value Date    A1C 7.9 2020    A1C 7.8 2019        Family history and social history were reviewed and updated from last visit.          Past Medical History:     Past Medical History:   Diagnosis Date     Diabetes mellitus, type 1 3/11/2009     Irritability and anger 2009    Followed by Dr. Kam.  Initiating 504 plan.     Transitory tachypnea of      Level 2 nursery x 30 hours     Underweight 2009     Unspecified fetal and  jaundice     Peak bilirubin = 18.0. Received phototherapy            Past Surgical History:     Past Surgical History:   Procedure Laterality Date     NO HISTORY OF SURGERY                 Social History:     Social History     Social History Narrative    7th grade ([id number])        Environmental History    Child is around people that smoke: No     Child's home has well water: Yes    Child's home has filtered water:No    Child has pets in the home: 2 dogs outside and 1 inside cat    Child's home/apartment was built before 1950:No    Child attends :     Child has exposure to sibling/playmate with lead poisoning: No    Child has exposure to someone with tuberculosis: No    Child home have guns/firearms without trigger locks: Yes    Child home have guns/firearms with trigger locks: No    There are concerns about the child's exposure to violence in the home: May be concerned about violence that comes from other kids (outside the home).                Parent #1    Name: Deborah A. Dubina, F, 66  Education: College   Occupation:     Parent #2    Name: Edward S. Dubina, M, 65  Education: College   Occupation: Homemaker        Relationship Status of Parent(s):     Who does-he child live with? mother and father    What language(s) is/are spoken at home? English         2018.  Lives in [place] with his parents, his 4 sisters and his sister's boyfriend.  Just started at [employer]. Interested in mechanical engineering.  Works part time at [employer]. Used to play hockey, no regular exercise now.        November 2018. 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 Eats a high protein diet, very little carb.        April 2019. Not currently working, though looking into going to school in the near future.        January 2020.  Just started at [employer] studying milling (precision metal work). Exercises by playing frisbee with his dog.  They live in [place].        June 2020.  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 His husky dog is 1 year old and they run together.        Feb 2021. Taking online classes. Eager to get started on the pump.              Family History:     Family History   Problem Relation Age of Onset     Arthritis Mother         rheumatoid     Asthma Father      Allergies Father      Thyroid Disease Maternal Grandmother         hyper     Diabetes Other         type 1     C.A.D. Sister         prolonged QT; dysautonomia     Asthma Sister             Allergies:     Allergies   Allergen Reactions     Latex Other (See Comments)     SITE INFECTIONS             Medications:     Current Outpatient Rx   Medication Sig Dispense Refill     acetaminophen (TYLENOL) 500 MG tablet Take 1 tablet (500 mg) by mouth every 6 hours as needed 20 tablet 0     betamethasone valerate (VALISONE) 0.1 % ointment Apply topically 2 times daily 45 g 0     blood glucose monitoring (MEG MICROLET) lancets Use to test blood sugar 6 times daily or as directed. 600 each 3     chlorhexidine (HIBICLENS) 4 % external liquid Use to clean off skin prior to pump site insertions. 120 mL 6     Continuous Blood Gluc Sensor (DEXCOM G6 SENSOR) MISC 1 each every 10 days 3 each 11     Continuous Blood Gluc Transmit  (DEXCOM G6 TRANSMITTER) MISC 1 each every 3 months 1 each 3     CONTOUR NEXT TEST test strip Use to test blood sugar 6 times daily or as directed. 550 each 3     Glucagon (GVOKE PFS) 1 MG/0.2ML SOSY Inject 1 mg Subcutaneous as needed (Hypoglycemic seizure or unconsciousness) 2 Syringe 3     ibuprofen (ADVIL,MOTRIN) 200 MG tablet Take 400 mg by mouth every 4 hours as needed        insulin glargine (LANTUS SOLOSTAR) 100 UNIT/ML pen Use up to 50 units per day 60 mL 3     insulin lispro (HUMALOG KWIKPEN) 100 UNIT/ML (1 unit dial) KWIKPEN Use up to 60 units per day when off of insulin pump 60 mL 3     insulin lispro (HUMALOG) 100 UNIT/ML vial Patient uses up to 100 units per day via insulin pump. 90 mL 3     insulin pen needle (B-D U/F) 31G X 5 MM miscellaneous Use 6 time(s) a day. 200 each 3     INSULIN PUMP ACCESSORIES MISC None Entered       ketone blood test (PRECISION XTRA KETONE) STRP Check blood ketones when two consecutive blood sugars are greater than 300 and/or at times of illness/vomiting. 60 each 3     melatonin 3 MG tablet Take 3 mg by mouth nightly as needed.       ondansetron (ZOFRAN) 4 MG tablet Take 1 tablet (4 mg) by mouth every 8 hours as needed for nausea 6 tablet 0     Pediatric Multivit-Minerals-C (FLINTSTONES SOUR GUMMIES PO) Take 1 chew tab by mouth daily               Review of Systems:     Comprehensive ROS negative other than the symptoms noted above in the HPI.          Physical Exam:     A complete physical exam was not performed due to covid-19 restrictions necessitating a video visit. By video, the patient appeared well, and was alert and interactive.  Speech was fluid and natural.  There was no shortness of breath.  The patient was active with normal range of motion observed.        Laboratory results:     TSH   Date Value Ref Range Status   08/30/2018 2.05 0.40 - 4.00 mU/L Final     Tissue Transglutaminase Antibody IgA   Date Value Ref Range Status   08/30/2018 1 <7 U/mL Final      Comment:     Negative  The tTG-IgA assay has limited utility for patients with decreased levels of   IgA. Screening for celiac disease should include IgA testing to rule out   selective IgA deficiency and to guide selection and interpretation of   serological testing. tTG-IgG testing may be positive in celiac disease   patients with IgA deficiency.       Tissue Transglutaminase Poonam IgG   Date Value Ref Range Status   08/30/2018 <1 <7 U/mL Final     Comment:     Negative     Testosterone Total   Date Value Ref Range Status   12/04/2014 118 0 - 1,200 ng/dL Final     Cholesterol   Date Value Ref Range Status   08/30/2018 130 <170 mg/dL Final     Albumin Urine mg/L   Date Value Ref Range Status   01/23/2020 18 mg/L Final     Triglycerides   Date Value Ref Range Status   08/30/2018 107 (H) <90 mg/dL Final     Comment:     Borderline high:   mg/dl  High:            >129 mg/dl       HDL Cholesterol   Date Value Ref Range Status   08/30/2018 41 (L) >45 mg/dL Final     Comment:     Low:             <40 mg/dl  Borderline low:   40-45 mg/dl       LDL Cholesterol Calculated   Date Value Ref Range Status   08/30/2018 68 <110 mg/dL Final     Cholesterol/HDL Ratio   Date Value Ref Range Status   12/04/2014 2.3 0.0 - 5.0 Final     Non HDL Cholesterol   Date Value Ref Range Status   08/30/2018 89 <120 mg/dL Final     Lab Results   Component Value Date    A1C 7.9 01/23/2020    A1C 7.8 04/18/2019    A1C 8.9 11/15/2018    A1C 10.3 10/04/2018    A1C 10.3 10/04/2018      Lab Results   Component Value Date    HEMOGLOBINA1 10.1 09/15/2011    HEMOGLOBINA1 10.0 06/30/2011    HEMOGLOBINA1 9.9 03/10/2011    HEMOGLOBINA1 10.6 12/09/2010    HEMOGLOBINA1 11.0 10/14/2010             Diabetes Health Maintenance    Date of Diabetes Diagnosis:  2009, age 9  Type of Diabetes:  Type 1    Episodes of DKA?   Episodes of severe (required help) hypoglycemia?  Date Last Eye Exam and location: >1 year  Date Last Flu Shot (note if refused): January  2020, refused Dec 2020  Date Last Annual Lab Studies---- 1/2020 urine, others 8/2018  Date of Last Visit:  12/17             Assessment and Plan:   Oziel is a 20 year old male with poorly controlled diabetes. He reports no problem taking the injections (although he sometimes forgets), but he has a really hard time poking his fingers.  A sensor should help with that. The management plan, based on the interpretation of the test results and conversation with the patient, is discussed below in the patient instructions.    Diabetes is a complicated and dangerous illness which requires intensive monitoring and treatment to prevent both short-term and long-term consequences to various organs. Insulin therapy is life-saving, but is also associated with life-threatening toxicity (hypoglycemia).  Careful and continuous attention to balancing glucose levels, activity, diet and insulin dosage is necessary.    I have reviewed the data and the therapy plan with the patient, and with the diabetes nurse educator who will communicate with the patient between visits to adjust insulin as needed.      Patient Instructions              Thank you for choosing [facility].     Maurice Littlejohn MD    It was a pleasure talking to you today! This visit note is available to you in [place]. If you see any errors or changes/additions you would like me to make to the note please let me know.    Please try to get that pump and sensor training done as soon as possible, and make sure they help you get set up so you can upload so we can look at your data.  Make an appointment with the nurses on a Wednesday after your pump training to make sure everything is going well. I'll see you back in person in 6 weeks.  We can get annual studies at that time.    As per your request I will email the pump settings to [email or web address].    YOUR INSULIN DOSE IS:  Current insulin / diabetes medication regimen:   When you are on Lantus:  Lantus 34  units   Humalog meal coverage 1 unit for every 8 grams  Humalog pre-meal correction 1 unit for every 30 above 120.     When you start your new insulin Medtronic 770 pump  Basal  -----1.3 units per hour  Carb ratio- 1 unit per 8 grams carbohydrate  Correction (Sensitivity) Factor 30  Targets  -----midnight 120-130  -----7AM?  Active insulin 2 hours      We recommend checking blood sugars 4-6 times per day, every day or using a sensor  Goal blood sugars:   fasting,  pre-meal, <180 2 hours after a meal.  (Higher fasting and bedtime numbers may be targeted for children under 5 yearsof age.)    Follow up in 6 weeks in person.    SCREENING RELATIVES FOR TYPE 1 DIABETES  As we are all currently homebound, this is a perfect time for T1D family members to get capillary autoantibody screenings through [employer].  It is quick, easy and can be done from the comfort of home.    Why screen now?  Autoantibody positive relatives of people with T1D may be eligible for prevention trials (studies to stop or delay progression to clinical diabetes).  While our clinical trials are on hold right now, we hope to resume them this summer. Screening positive for autoantibodies right now puts subjects on a list for possible study inclusion once we are up and running again. There are a number of prevention and new onset studies ready to begin as soon as COVID-19 research restrictions are lifted.    Who is eligible to be screened?    Age 2.5 to 45 years and a sibling, offspring, or parent of an individual with type 1 diabetes    Age 2.5 to 20 years and a niece, nephew, aunt, uncle, grandchild, cousin, or half sibling of an individual with type 1 diabetes  How does remote capillary screening work?     There is a [employer] screening website where you can sign-up, consent online, and request an at-home kit.    The website is [email or web address]     [employer] will mail you a kit including instructions and all the  necessary materials.     The test requires about 10-12 drops of blood.     The kit includes instructions to ship the sample back via [employer] within 24 hours of collection. There is a number to arrange free home pick-up by [employer].    Sick Day Plan:  Pump Failure:  IF YOUR PUMP FAILS AND YOU NEED TO TAKE BASAL INSULIN (GLARGINE, BASAGLAR, TRESIBA, LEVEMIR) THE DOSE IS: 34 units  Remember when you restart your pump that the basal insulin lasts 24 hours so wait until 24 hours is up before starting your pump basal rate.Call on-call endocrinologist or diabetes nurse if this happens. You should also plan to call the pump company right away to troubleshoot the pump failure.    Hyperglycemia (high blood glucose):  Ketones:  Check urine/blood ketones if Oziel is sick, vomiting, or if blood glucose is above 240 twice in a row. Call on-call endocrinologist or diabetes nurse if ketones are present.    Hypoglycemia (low blood glucose):  If blood glucose is 60 to 80:  1.  Eat or drink 1 carb unit (15 grams carbohydrate).   One carb unit equals:   - 1/2 cup (4 ounces) juice or regular soda pop, or   - 1 cup (8 ounces) milk, or   - 3 to 4 glucose tablets  2.  Re-check your blood glucose in 15 minutes.  3.  Repeat these steps every 15 minutes until your blood glucose is above 100.    If blood glucose is under 60:  1.  Eat or drink 2 carb units (30 grams carbohydrate).  Two carb units equal:   - 1 cup (8 ounces) juice or regular soda pop, or   - 2 cups (16 ounces) milk, or   - 6 to 8 glucose tablets.  2.  Re-check your blood glucose in 15 minutes.  3.  Repeat these steps every 15 minutes until your blood glucose is above 100.      If you had any blood work, imaging or other tests:  Normal test results will be mailed to your home address in a letter.  Abnormal results will be communicated to you via phone call / letter.  Please allow 2 weeks for processing/interpretation of most lab work.  For urgent issues that cannot wait until the  next business day, call [phone] and ask for the Pediatric Endocrinologist on call.    You may contact the diabetes nurses with any questions at [phone].  Randi Dasilva, RN, BSN; Elvia Navarro, RN; or Nanda Lino RN, BAN may answer, depending on the day. Calls will be returned as soon as possible.      Medication renewal requests must be faxed to the main office by your pharmacy.  Allow 3-4 days for completion.   Main Office: [phone]  Fax: [phone]    Scheduling:    Pediatric Call Center for [facility], [phone]  [facility], 9th floor [phone]  Infusion Center: [phone] (for stimulation tests)  Radiology/ [phone]     Services:   [phone]     We encourage you to sign up for [employer] for easy communication with us.  Sign up at the clinic  or go to [email or web address].     Please try the [facility] to [facility] phone application for Virtual Tours, Procedure Preparation, Resources, Preparation for Hospital Stay and the Coloring Board.              Thank you for allowing me to participate in the care of your patient.  Please do not hesitate to call with questions or concerns.    Sincerely,    Farzana Littlejohn MD  Professor and   Pediatric Endocrinology  [facility]    >40 min were spent on the date of the encounter in chart review, patient visit, review of tests, documentation and discussion with the diabetes nurse educator about the issues documented above.     [facility], [facility], [place]

## 2021-02-11 ENCOUNTER — VIRTUAL VISIT (OUTPATIENT)
Dept: ENDOCRINOLOGY | Facility: CLINIC | Age: 21
End: 2021-02-11
Attending: PEDIATRICS
Payer: COMMERCIAL

## 2021-02-11 DIAGNOSIS — E10.65 TYPE 1 DIABETES MELLITUS WITH HYPERGLYCEMIA (H): Primary | ICD-10-CM

## 2021-02-11 PROCEDURE — 99215 OFFICE O/P EST HI 40 MIN: CPT | Mod: 95 | Performed by: PEDIATRICS

## 2021-02-11 NOTE — LETTER
"  2/11/2021      RE: Nicholas M Dubina  [address] [place] [id number]       The patient is being evaluated via a billable video visit.      The parent/guardian has been notified of following: \"This video visit will be conducted via a call between you, your child, and your child's physician/provider. We have found that certain health care needs can be provided without the need for an in-person physical exam.  This service lets us provide the care you need with a video conversation.  If a prescription is necessary we can send it directly to your pharmacy.  If lab work is needed we can place an order for that and you can then stop by our lab to have the test done at a later time. Video visits are billed at different rates depending on your insurance coverage.  Please reach out to your insurance provider with any questions.If during the course of the call the physician/provider feels a video visit is not appropriate, you will not be charged for this service.\"    Parent/guardian has given verbal consent for Video visit? YES  How would you like to obtain your AVS?: Mail    Video-Visit Details  Type of service:  Video Visit    Video Start Time: 2:30  Video End Time: 2:55 (ended by phone after video link went bad    Originating Location (pt. Location): Home  Distant Location (provider location):  [facility]   Platform used for Video Visit: MyColorScreen    Pediatric Endocrinology Return Consultation:  Diabetes Video Virtual Visit  :   Patient: Nicholas M Dubina MRN# [id number]   YOB: 2000 Age: 20 year old   Date of Visit: 2/11/2021  Dear Dr. Henning Kim C*:    I had the pleasure of visiting with your patient, Nicholas M Dubina on a video virtual visit from the Pediatric Endocrinology Clinic, [facility], on 2/11/2021 for a return consultation regarding type 1 diabetes.           Problem list:     Patient Active Problem List    Diagnosis Date Noted     Type 1 " diabetes mellitus with hyperglycemia (H) 02/04/2016     Priority: Medium     Emesis 12/15/2013     Priority: Medium     Irritability and anger 09/22/2009     Priority: Medium     Followed by Dr. Kam.  Initiating 504 plan.       Underweight 09/22/2009     Priority: Medium            HPI:   Oziel is a 20 year old male with Type 1 diabetes mellitus. I had a video visit with Francisco and his mother which turned into a telephone visit after our connection went bad.    I have reviewed the available past laboratory evaluations, imaging studies, and medical records available to me at this visit.    History was obtained from the patient and the medical record.    I independently reviewed and interpretted the blood glucose numbers.      TODAY'S CONCERNS  1.  Due for annual studies (ordered).  2.  He has a new pump and was supposed to come in for training, hasn't happened but he would like to get going on this and has been in contact with [employer] to set this up..  3. He read his pump for me over the phone, not a lot of numbers.  4.  Most common time to test is 3-4am. Sometimes he stays up until then and sometime he goes to bed earlier and gets up then.    SOCIAL DETERMINANTS OF HEALTH IMPACTING HEALTH MANAGEMENT  He has had 2 failed or cancelled appointment since I saw him in December and 8 cancelled/failed appointments before that.    INTERPRETATION OF DIABETES TESTS   am noon pm hs 3am   11-Feb        10-Feb     180   9-Feb     205   8-Feb        7-Feb   110     6-Feb    217 495   5-Feb   64       The 495 was a day he forgot his Lantus. He says he almost never forgets injections.    Result was discussed at today's visit.     Current insulin / diabetes medication regimen:   When you are on Lantus:  Lantus 34 units   Humalog meal coverage 1 unit for every 8 grams  Humalog pre-meal correction 1 unit for every 30 above 120.     When you start your new insulin Medtronic 770 pump  Basal  -----1.3 units per hour  Carb ratio- 1  unit per 8 grams carbohydrate  Correction (Sensitivity) Factor 30  Targets  -----midnight 120-130  -----7AM?  Active insulin 2 hours    A1c:  Previous two HbA1c results:   Lab Results   Component Value Date    A1C 7.9 2020    A1C 7.8 2019        Family history and social history were reviewed and updated from last visit.          Past Medical History:     Past Medical History:   Diagnosis Date     Diabetes mellitus, type 1 3/11/2009     Irritability and anger 2009    Followed by Dr. Kam.  Initiating 504 plan.     Transitory tachypnea of      Level 2 nursery x 30 hours     Underweight 2009     Unspecified fetal and  jaundice     Peak bilirubin = 18.0. Received phototherapy            Past Surgical History:     Past Surgical History:   Procedure Laterality Date     NO HISTORY OF SURGERY                 Social History:     Social History     Social History Narrative    7th grade ([id number])        Environmental History    Child is around people that smoke: No     Child's home has well water: Yes    Child's home has filtered water:No    Child has pets in the home: 2 dogs outside and 1 inside cat    Child's home/apartment was built before 1950:No    Child attends :     Child has exposure to sibling/playmate with lead poisoning: No    Child has exposure to someone with tuberculosis: No    Child home have guns/firearms without trigger locks: Yes    Child home have guns/firearms with trigger locks: No    There are concerns about the child's exposure to violence in the home: May be concerned about violence that comes from other kids (outside the home).                Parent #1    Name: Deborah A. Dubina, F, 66  Education: College   Occupation:     Parent #2    Name: Edward S. Dubina, M, 65  Education: College   Occupation: Homemaker        Relationship Status of Parent(s):     Who does-he child live with? mother and father    What  language(s) is/are spoken at home? English         August 2018. Lives in [place] with his parents, his 4 sisters and his sister's boyfriend.  Just started at [employer]. Interested in mechanical engineering.  Works part time at [employer]. Used to play hockey, no regular exercise now.        November 2018. 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 Eats a high protein diet, very little carb.        April 2019. Not currently working, though looking into going to school in the near future.        January 2020.  Just started at [employer] studying milling (precision metal work). Exercises by playing frisbee with his dog.  They live in [place].        June 2020.  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 His husky dog is 1 year old and they run together.        Feb 2021. Taking online classes. Eager to get started on the pump.              Family History:     Family History   Problem Relation Age of Onset     Arthritis Mother         rheumatoid     Asthma Father      Allergies Father      Thyroid Disease Maternal Grandmother         hyper     Diabetes Other         type 1     C.A.D. Sister         prolonged QT; dysautonomia     Asthma Sister             Allergies:     Allergies   Allergen Reactions     Latex Other (See Comments)     SITE INFECTIONS             Medications:     Current Outpatient Rx   Medication Sig Dispense Refill     acetaminophen (TYLENOL) 500 MG tablet Take 1 tablet (500 mg) by mouth every 6 hours as needed 20 tablet 0     betamethasone valerate (VALISONE) 0.1 % ointment Apply topically 2 times daily 45 g 0     blood glucose monitoring (MEG MICROLET) lancets Use to test blood sugar 6 times daily or as directed. 600 each 3     chlorhexidine (HIBICLENS) 4 % external liquid Use to clean off skin prior to pump site insertions. 120 mL 6     Continuous Blood Gluc Sensor (DEXCOM G6 SENSOR) MISC 1  each every 10 days 3 each 11     Continuous Blood Gluc Transmit (DEXCOM G6 TRANSMITTER) MISC 1 each every 3 months 1 each 3     CONTOUR NEXT TEST test strip Use to test blood sugar 6 times daily or as directed. 550 each 3     Glucagon (GVOKE PFS) 1 MG/0.2ML SOSY Inject 1 mg Subcutaneous as needed (Hypoglycemic seizure or unconsciousness) 2 Syringe 3     ibuprofen (ADVIL,MOTRIN) 200 MG tablet Take 400 mg by mouth every 4 hours as needed        insulin glargine (LANTUS SOLOSTAR) 100 UNIT/ML pen Use up to 50 units per day 60 mL 3     insulin lispro (HUMALOG KWIKPEN) 100 UNIT/ML (1 unit dial) KWIKPEN Use up to 60 units per day when off of insulin pump 60 mL 3     insulin lispro (HUMALOG) 100 UNIT/ML vial Patient uses up to 100 units per day via insulin pump. 90 mL 3     insulin pen needle (B-D U/F) 31G X 5 MM miscellaneous Use 6 time(s) a day. 200 each 3     INSULIN PUMP ACCESSORIES MISC None Entered       ketone blood test (PRECISION XTRA KETONE) STRP Check blood ketones when two consecutive blood sugars are greater than 300 and/or at times of illness/vomiting. 60 each 3     melatonin 3 MG tablet Take 3 mg by mouth nightly as needed.       ondansetron (ZOFRAN) 4 MG tablet Take 1 tablet (4 mg) by mouth every 8 hours as needed for nausea 6 tablet 0     Pediatric Multivit-Minerals-C (FLINTSTONES SOUR GUMMIES PO) Take 1 chew tab by mouth daily               Review of Systems:     Comprehensive ROS negative other than the symptoms noted above in the HPI.          Physical Exam:     A complete physical exam was not performed due to covid-19 restrictions necessitating a video visit. By video, the patient appeared well, and was alert and interactive.  Speech was fluid and natural.  There was no shortness of breath.  The patient was active with normal range of motion observed.        Laboratory results:     TSH   Date Value Ref Range Status   08/30/2018 2.05 0.40 - 4.00 mU/L Final     Tissue Transglutaminase Antibody IgA   Date  Value Ref Range Status   08/30/2018 1 <7 U/mL Final     Comment:     Negative  The tTG-IgA assay has limited utility for patients with decreased levels of   IgA. Screening for celiac disease should include IgA testing to rule out   selective IgA deficiency and to guide selection and interpretation of   serological testing. tTG-IgG testing may be positive in celiac disease   patients with IgA deficiency.       Tissue Transglutaminase Poonam IgG   Date Value Ref Range Status   08/30/2018 <1 <7 U/mL Final     Comment:     Negative     Testosterone Total   Date Value Ref Range Status   12/04/2014 118 0 - 1,200 ng/dL Final     Cholesterol   Date Value Ref Range Status   08/30/2018 130 <170 mg/dL Final     Albumin Urine mg/L   Date Value Ref Range Status   01/23/2020 18 mg/L Final     Triglycerides   Date Value Ref Range Status   08/30/2018 107 (H) <90 mg/dL Final     Comment:     Borderline high:   mg/dl  High:            >129 mg/dl       HDL Cholesterol   Date Value Ref Range Status   08/30/2018 41 (L) >45 mg/dL Final     Comment:     Low:             <40 mg/dl  Borderline low:   40-45 mg/dl       LDL Cholesterol Calculated   Date Value Ref Range Status   08/30/2018 68 <110 mg/dL Final     Cholesterol/HDL Ratio   Date Value Ref Range Status   12/04/2014 2.3 0.0 - 5.0 Final     Non HDL Cholesterol   Date Value Ref Range Status   08/30/2018 89 <120 mg/dL Final     Lab Results   Component Value Date    A1C 7.9 01/23/2020    A1C 7.8 04/18/2019    A1C 8.9 11/15/2018    A1C 10.3 10/04/2018    A1C 10.3 10/04/2018      Lab Results   Component Value Date    HEMOGLOBINA1 10.1 09/15/2011    HEMOGLOBINA1 10.0 06/30/2011    HEMOGLOBINA1 9.9 03/10/2011    HEMOGLOBINA1 10.6 12/09/2010    HEMOGLOBINA1 11.0 10/14/2010             Diabetes Health Maintenance    Date of Diabetes Diagnosis:  2009, age 9  Type of Diabetes:  Type 1    Episodes of DKA?   Episodes of severe (required help) hypoglycemia?  Date Last Eye Exam and location: >1  year  Date Last Flu Shot (note if refused): January 2020, refused Dec 2020  Date Last Annual Lab Studies---- 1/2020 urine, others 8/2018  Date of Last Visit:  12/17             Assessment and Plan:   Oziel is a 20 year old male with poorly controlled diabetes. He reports no problem taking the injections (although he sometimes forgets), but he has a really hard time poking his fingers.  A sensor should help with that. The management plan, based on the interpretation of the test results and conversation with the patient, is discussed below in the patient instructions.    Diabetes is a complicated and dangerous illness which requires intensive monitoring and treatment to prevent both short-term and long-term consequences to various organs. Insulin therapy is life-saving, but is also associated with life-threatening toxicity (hypoglycemia).  Careful and continuous attention to balancing glucose levels, activity, diet and insulin dosage is necessary.    I have reviewed the data and the therapy plan with the patient, and with the diabetes nurse educator who will communicate with the patient between visits to adjust insulin as needed.      Patient Instructions              Thank you for choosing [facility].     Maurice Littlejohn MD    It was a pleasure talking to you today! This visit note is available to you in [employer]. If you see any errors or changes/additions you would like me to make to the note please let me know.    Please try to get that pump and sensor training done as soon as possible, and make sure they help you get set up so you can upload so we can look at your data.  Make an appointment with the nurses on a Wednesday after your pump training to make sure everything is going well. I'll see you back in person in 6 weeks.  We can get annual studies at that time.    As per your request I will email the pump settings to [email or web address].    YOUR INSULIN DOSE IS:  Current insulin / diabetes  medication regimen:   When you are on Lantus:  Lantus 34 units   Humalog meal coverage 1 unit for every 8 grams  Humalog pre-meal correction 1 unit for every 30 above 120.     When you start your new insulin Medtronic 770 pump  Basal  -----1.3 units per hour  Carb ratio- 1 unit per 8 grams carbohydrate  Correction (Sensitivity) Factor 30  Targets  -----midnight 120-130  -----7AM?  Active insulin 2 hours      We recommend checking blood sugars 4-6 times per day, every day or using a sensor  Goal blood sugars:   fasting,  pre-meal, <180 2 hours after a meal.  (Higher fasting and bedtime numbers may be targeted for children under 5 yearsof age.)    Follow up in 6 weeks in person.    SCREENING RELATIVES FOR TYPE 1 DIABETES  As we are all currently homebound, this is a perfect time for T1D family members to get capillary autoantibody screenings through [employer].  It is quick, easy and can be done from the comfort of home.    Why screen now?  Autoantibody positive relatives of people with T1D may be eligible for prevention trials (studies to stop or delay progression to clinical diabetes).  While our clinical trials are on hold right now, we hope to resume them this summer. Screening positive for autoantibodies right now puts subjects on a list for possible study inclusion once we are up and running again. There are a number of prevention and new onset studies ready to begin as soon as COVID-19 research restrictions are lifted.    Who is eligible to be screened?    Age 2.5 to 45 years and a sibling, offspring, or parent of an individual with type 1 diabetes    Age 2.5 to 20 years and a niece, nephew, aunt, uncle, grandchild, cousin, or half sibling of an individual with type 1 diabetes  How does remote capillary screening work?     There is a [employer] screening website where you can sign-up, consent online, and request an at-home kit.    The website is [email or web address]     [employer] will  mail you a kit including instructions and all the necessary materials.     The test requires about 10-12 drops of blood.     The kit includes instructions to ship the sample back via [employer] within 24 hours of collection. There is a number to arrange free home pick-up by [employer].    Sick Day Plan:  Pump Failure:  IF YOUR PUMP FAILS AND YOU NEED TO TAKE BASAL INSULIN (GLARGINE, BASAGLAR, TRESIBA, LEVEMIR) THE DOSE IS: 34 units  Remember when you restart your pump that the basal insulin lasts 24 hours so wait until 24 hours is up before starting your pump basal rate.Call on-call endocrinologist or diabetes nurse if this happens. You should also plan to call the pump company right away to troubleshoot the pump failure.    Hyperglycemia (high blood glucose):  Ketones:  Check urine/blood ketones if Oziel is sick, vomiting, or if blood glucose is above 240 twice in a row. Call on-call endocrinologist or diabetes nurse if ketones are present.    Hypoglycemia (low blood glucose):  If blood glucose is 60 to 80:  1.  Eat or drink 1 carb unit (15 grams carbohydrate).   One carb unit equals:   - 1/2 cup (4 ounces) juice or regular soda pop, or   - 1 cup (8 ounces) milk, or   - 3 to 4 glucose tablets  2.  Re-check your blood glucose in 15 minutes.  3.  Repeat these steps every 15 minutes until your blood glucose is above 100.    If blood glucose is under 60:  1.  Eat or drink 2 carb units (30 grams carbohydrate).  Two carb units equal:   - 1 cup (8 ounces) juice or regular soda pop, or   - 2 cups (16 ounces) milk, or   - 6 to 8 glucose tablets.  2.  Re-check your blood glucose in 15 minutes.  3.  Repeat these steps every 15 minutes until your blood glucose is above 100.      If you had any blood work, imaging or other tests:  Normal test results will be mailed to your home address in a letter.  Abnormal results will be communicated to you via phone call / letter.  Please allow 2 weeks for processing/interpretation of most lab  work.  For urgent issues that cannot wait until the next business day, call [phone] and ask for the Pediatric Endocrinologist on call.    You may contact the diabetes nurses with any questions at [phone].  Randi Dasilva, RN, BSN; Elvia Navarro, RN; or Nanda Lino RN, BAN may answer, depending on the day. Calls will be returned as soon as possible.      Medication renewal requests must be faxed to the main office by your pharmacy.  Allow 3-4 days for completion.   Main Office: [phone]  Fax: [phone]    Scheduling:    Pediatric Call Center for [facility], [phone]  [facility], 9th floor [phone]  Infusion Center: [phone] (for stimulation tests)  Radiology/ [phone]     Services:   [phone]     We encourage you to sign up for [employer] for easy communication with us.  Sign up at the clinic  or go to [email or web address].     Please try the [facility] ([facility]) phone application for Virtual Tours, Procedure Preparation, Resources, Preparation for Hospital Stay and the Coloring Board.              Thank you for allowing me to participate in the care of your patient.  Please do not hesitate to call with questions or concerns.    Sincerely,    Farzana Littlejohn MD  Professor and   Pediatric Endocrinology  [facility]    >40 min were spent on the date of the encounter in chart review, patient visit, review of tests, documentation and discussion with the diabetes nurse educator about the issues documented above.       CLINIC, [facility]      Farzana Littlejohn MD

## 2021-02-11 NOTE — NURSING NOTE
How would you like to obtain your AVS? MyChart Nicholas M Dubina complains of    Chief Complaint   Patient presents with     RECHECK     Diabetes follow up       Patient would like the video invitation sent by: Send to e-mail at: [email or web address]     Patient is located in [place]? Yes     I have reviewed and updated the patient's medication list, allergies and preferred pharmacy.      Rupinder Jason LPN

## 2021-02-11 NOTE — PATIENT INSTRUCTIONS
Thank you for choosing [facility].     Maurice Littlejohn MD    It was a pleasure talking to you today! This visit note is available to you in [email or web address]. If you see any errors or changes/additions you would like me to make to the note please let me know.    Please try to get that pump and sensor training done as soon as possible, and make sure they help you get set up so you can upload so we can look at your data.  Make an appointment with the nurses on a Wednesday after your pump training to make sure everything is going well. I'll see you back in person in 6 weeks.  We can get annual studies at that time.    As per your request I will email the pump settings to [email or web address].    YOUR INSULIN DOSE IS:  Current insulin / diabetes medication regimen:   When you are on Lantus:  Lantus 34 units   Humalog meal coverage 1 unit for every 8 grams  Humalog pre-meal correction 1 unit for every 30 above 120.     When you start your new insulin Medtronic 770 pump  Basal  -----1.3 units per hour  Carb ratio- 1 unit per 8 grams carbohydrate  Correction (Sensitivity) Factor 30  Targets  -----midnight 120-130  -----7AM?  Active insulin 2 hours      We recommend checking blood sugars 4-6 times per day, every day or using a sensor  Goal blood sugars:   fasting,  pre-meal, <180 2 hours after a meal.  (Higher fasting and bedtime numbers may be targeted for children under 5 yearsof age.)    Follow up in 6 weeks in person.    SCREENING RELATIVES FOR TYPE 1 DIABETES  As we are all currently homebound, this is a perfect time for T1D family members to get capillary autoantibody screenings through [employer].  It is quick, easy and can be done from the comfort of home.    Why screen now?  Autoantibody positive relatives of people with T1D may be eligible for prevention trials (studies to stop or delay progression to clinical diabetes).  While our clinical trials are on hold right now, we hope to  resume them this summer. Screening positive for autoantibodies right now puts subjects on a list for possible study inclusion once we are up and running again. There are a number of prevention and new onset studies ready to begin as soon as COVID-19 research restrictions are lifted.    Who is eligible to be screened?    Age 2.5 to 45 years and a sibling, offspring, or parent of an individual with type 1 diabetes    Age 2.5 to 20 years and a niece, nephew, aunt, uncle, grandchild, cousin, or half sibling of an individual with type 1 diabetes  How does remote capillary screening work?     There is a [employer] screening website where you can sign-up, consent online, and request an at-home kit.    The website is [email or web address]     [employer] will mail you a kit including instructions and all the necessary materials.     The test requires about 10-12 drops of blood.     The kit includes instructions to ship the sample back via [employer] within 24 hours of collection. There is a number to arrange free home pick-up by [employer].    Sick Day Plan:  Pump Failure:  IF YOUR PUMP FAILS AND YOU NEED TO TAKE BASAL INSULIN (GLARGINE, BASAGLAR, TRESIBA, LEVEMIR) THE DOSE IS: 34 units  Remember when you restart your pump that the basal insulin lasts 24 hours so wait until 24 hours is up before starting your pump basal rate.Call on-call endocrinologist or diabetes nurse if this happens. You should also plan to call the pump company right away to troubleshoot the pump failure.    Hyperglycemia (high blood glucose):  Ketones:  Check urine/blood ketones if Oziel is sick, vomiting, or if blood glucose is above 240 twice in a row. Call on-call endocrinologist or diabetes nurse if ketones are present.    Hypoglycemia (low blood glucose):  If blood glucose is 60 to 80:  1.  Eat or drink 1 carb unit (15 grams carbohydrate).   One carb unit equals:   - 1/2 cup (4 ounces) juice or regular soda pop, or   - 1 cup (8 ounces) milk,  or   - 3 to 4 glucose tablets  2.  Re-check your blood glucose in 15 minutes.  3.  Repeat these steps every 15 minutes until your blood glucose is above 100.    If blood glucose is under 60:  1.  Eat or drink 2 carb units (30 grams carbohydrate).  Two carb units equal:   - 1 cup (8 ounces) juice or regular soda pop, or   - 2 cups (16 ounces) milk, or   - 6 to 8 glucose tablets.  2.  Re-check your blood glucose in 15 minutes.  3.  Repeat these steps every 15 minutes until your blood glucose is above 100.      If you had any blood work, imaging or other tests:  Normal test results will be mailed to your home address in a letter.  Abnormal results will be communicated to you via phone call / letter.  Please allow 2 weeks for processing/interpretation of most lab work.  For urgent issues that cannot wait until the next business day, call [phone] and ask for the Pediatric Endocrinologist on call.    You may contact the diabetes nurses with any questions at [phone].  Randi Dasilva RN, BSN; Elvia Navarro RN; or Nanda Lino RN, BAN may answer, depending on the day. Calls will be returned as soon as possible.      Medication renewal requests must be faxed to the main office by your pharmacy.  Allow 3-4 days for completion.   Main Office: [phone]  Fax: [phone]    Scheduling:    Pediatric Call Center for [facility], [phone]  [facility], 9th floor [phone]  Infusion Center: [phone] (for stimulation tests)  Radiology/ [phone]     Services:   [phone]     We encourage you to sign up for [employer] for easy communication with us.  Sign up at the clinic  or go to [email or web address].     Please try the Passport to [facility] ([facility] phone application for Virtual Tours, Procedure Preparation, Resources, Preparation for Hospital Stay and the Coloring Board.

## 2021-03-29 DIAGNOSIS — E10.65 TYPE 1 DIABETES MELLITUS WITH HYPERGLYCEMIA (H): Primary | ICD-10-CM

## 2021-03-29 RX ORDER — BLOOD SUGAR DIAGNOSTIC
STRIP MISCELLANEOUS
Qty: 200 STRIP | Refills: 11 | Status: SHIPPED | OUTPATIENT
Start: 2021-03-29 | End: 2023-11-21

## 2021-05-30 ENCOUNTER — HEALTH MAINTENANCE LETTER (OUTPATIENT)
Age: 21
End: 2021-05-30

## 2021-06-23 ENCOUNTER — APPOINTMENT (OUTPATIENT)
Dept: GENERAL RADIOLOGY | Facility: CLINIC | Age: 21
End: 2021-06-23
Attending: PHYSICIAN ASSISTANT
Payer: COMMERCIAL

## 2021-06-23 ENCOUNTER — HOSPITAL ENCOUNTER (EMERGENCY)
Facility: CLINIC | Age: 21
Discharge: HOME | End: 2021-06-23
Attending: PHYSICIAN ASSISTANT | Admitting: PHYSICIAN ASSISTANT
Payer: COMMERCIAL

## 2021-06-23 VITALS
HEART RATE: 76 BPM | DIASTOLIC BLOOD PRESSURE: 68 MMHG | TEMPERATURE: 98.5 F | HEIGHT: 71 IN | SYSTOLIC BLOOD PRESSURE: 115 MMHG | WEIGHT: 153.88 LBS | BODY MASS INDEX: 21.54 KG/M2 | OXYGEN SATURATION: 98 %

## 2021-06-23 DIAGNOSIS — M79.672 PAIN OF LEFT HEEL: ICD-10-CM

## 2021-06-23 DIAGNOSIS — S99.922A FOOT INJURY, LEFT, INITIAL ENCOUNTER: ICD-10-CM

## 2021-06-23 PROCEDURE — 99203 OFFICE O/P NEW LOW 30 MIN: CPT | Performed by: PHYSICIAN ASSISTANT

## 2021-06-23 PROCEDURE — 73630 X-RAY EXAM OF FOOT: CPT | Mod: LT

## 2021-06-23 PROCEDURE — G0463 HOSPITAL OUTPT CLINIC VISIT: HCPCS | Performed by: PHYSICIAN ASSISTANT

## 2021-06-23 ASSESSMENT — ENCOUNTER SYMPTOMS: CONSTITUTIONAL NEGATIVE: 1

## 2021-06-23 ASSESSMENT — MIFFLIN-ST. JEOR: SCORE: 1725.13

## 2021-06-23 NOTE — ED PROVIDER NOTES
History     Chief Complaint   Patient presents with     Foot Pain     lt foot injury     HPI  Nicholas M Dubina is a 21 year old male with history of type 1 diabetes mellitus who presents with complaints of left foot injury yesterday.  Patient states he was running and he hopped over a small retaining wall and when he landed, he injured his left foot.  He states he is unsure exactly what he did to his foot but thinks he may have rolled it.  He has had pain with weightbearing to the heel of his left foot that wraps around the lateral aspect of his foot.  He has been icing the area and taking ibuprofen and Tylenol without improvement.  He has been using crutches to assist with ambulation.  Patient denies associated ankle pain or swelling.      Allergies:  Allergies   Allergen Reactions     Latex Other (See Comments)     SITE INFECTIONS       Problem List:    Patient Active Problem List    Diagnosis Date Noted     Type 1 diabetes mellitus with hyperglycemia (H) 2016     Priority: Medium     Emesis 12/15/2013     Priority: Medium     Irritability and anger 2009     Priority: Medium     Followed by Dr. Kam.  Initiating 504 plan.       Underweight 2009     Priority: Medium        Past Medical History:    Past Medical History:   Diagnosis Date     Diabetes mellitus, type 1 3/11/2009     Irritability and anger 2009     Transitory tachypnea of       Underweight 2009     Unspecified fetal and  jaundice        Past Surgical History:    Past Surgical History:   Procedure Laterality Date     NO HISTORY OF SURGERY         Family History:    Family History   Problem Relation Age of Onset     Arthritis Mother         rheumatoid     Asthma Father      Allergies Father      Thyroid Disease Maternal Grandmother         hyper     Diabetes Other         type 1     C.A.D. Sister         prolonged QT; dysautonomia     Asthma Sister        Social History:  Marital Status:  Single [1]  Social  "History     Tobacco Use     Smoking status: Never Smoker     Smokeless tobacco: Never Used   Substance Use Topics     Alcohol use: No     Drug use: No        Medications:    acetaminophen (TYLENOL) 500 MG tablet  betamethasone valerate (VALISONE) 0.1 % ointment  blood glucose (ACCU-CHEK GUIDE) test strip  blood glucose monitoring (MEG MICROLET) lancets  chlorhexidine (HIBICLENS) 4 % external liquid  Continuous Blood Gluc Sensor (DEXCOM G6 SENSOR) MISC  Continuous Blood Gluc Transmit (DEXCOM G6 TRANSMITTER) MISC  CONTOUR NEXT TEST test strip  Glucagon (GVOKE PFS) 1 MG/0.2ML SOSY  ibuprofen (ADVIL,MOTRIN) 200 MG tablet  insulin glargine (LANTUS SOLOSTAR) 100 UNIT/ML pen  insulin lispro (HUMALOG KWIKPEN) 100 UNIT/ML (1 unit dial) KWIKPEN  insulin lispro (HUMALOG) 100 UNIT/ML vial  insulin pen needle (B-D U/F) 31G X 5 MM miscellaneous  Insulin Pen Needle 31G X 4 MM MISC  INSULIN PUMP ACCESSORIES MISC  ketone blood test (PRECISION XTRA KETONE) STRP  melatonin 3 MG tablet  ondansetron (ZOFRAN) 4 MG tablet  Pediatric Multivit-Minerals-C (FLINTSTONES SOUR GUMMIES PO)          Review of Systems   Constitutional: Negative.    Musculoskeletal:        Left foot injury   Skin: Negative.    All other systems reviewed and are negative.      Physical Exam   BP: 115/68  Pulse: 76  Temp: 98.5  F (36.9  C)  Height: 180.3 cm (5' 11\")  Weight: 69.8 kg (153 lb 14.1 oz)  SpO2: 98 %      Physical Exam  Constitutional:       General: He is not in acute distress.     Appearance: Normal appearance. He is well-developed. He is not ill-appearing, toxic-appearing or diaphoretic.   HENT:      Head: Normocephalic and atraumatic.   Eyes:      Conjunctiva/sclera: Conjunctivae normal.   Neck:      Musculoskeletal: Neck supple.   Cardiovascular:      Pulses: Normal pulses.   Pulmonary:      Effort: Pulmonary effort is normal.   Musculoskeletal: Normal range of motion.      Left ankle: Normal. He exhibits normal range of motion, no swelling, no " ecchymosis and no deformity. No tenderness. Achilles tendon exhibits no pain and no defect.      Left foot: Normal range of motion and normal capillary refill. Tenderness and bony tenderness present. No swelling, crepitus, deformity or laceration.        Feet:       Comments: Tenderness to palpation along the left heel and lateral foot.  No associated swelling or skin changes.  No tenderness to the left ankle.  Patient is able to fully dorsiflex and plantarflex his foot without difficulties.  No palpable tenderness or defect along Achilles tendon.   Skin:     General: Skin is warm and dry.      Capillary Refill: Capillary refill takes less than 2 seconds.   Neurological:      General: No focal deficit present.      Mental Status: He is alert.      Sensory: Sensation is intact.      Motor: Motor function is intact.         ED Course        Procedures    Results for orders placed or performed during the hospital encounter of 06/23/21 (from the past 24 hour(s))   Foot XR, G/E 3 views, left    Narrative    EXAM: XR FOOT LEFT G/E 3 VIEWS  LOCATION: [facility]  DATE/TIME: 6/23/2021 6:54 PM    INDICATION: Heel and lateral foot pain.  COMPARISON: None.      Impression    IMPRESSION: Normal joint spaces and alignment. No fracture.       Medications - No data to display    Assessments & Plan (with Medical Decision Making)     Pt is a 21 year old male with history of type 1 diabetes mellitus who presents with complaints of left foot injury yesterday.  Patient states he was running and he hopped over a small retaining wall and when he landed, he injured his left foot.  He states he is unsure exactly what he did to his foot but thinks he may have rolled it.  He has had pain with weightbearing to the heel of his left foot that wraps around the lateral aspect of his foot.      Pt is afebrile on arrival.  Exam as above.  X-rays of left foot were negative for fracture or acute pathology.  Discussed results with  patient.  Encouraged continued symptomatic treatments at home.  Return precautions were reviewed.  Hand-outs were provided.    Patient was instructed to follow-up with PCP in 3-5 days for continued care and management or sooner if new or worsening symptoms.  He is to return to the ED for persistent and/or worsening symptoms.  Patient expressed understanding of the diagnosis and plan and was discharged home in good condition.    I have reviewed the nursing notes.    I have reviewed the findings, diagnosis, plan and need for follow up with the patient.    Discharge Medication List as of 6/23/2021  7:24 PM          Final diagnoses:   Pain of left heel   Foot injury, left, initial encounter       6/23/2021   [facility] EMERGENCY DEPT      Disclaimer:  This note consists of symbols derived from keyboarding, dictation and/or voice recognition software.  As a result, there may be errors in the script that have gone undetected.  Please consider this when interpreting information found in this chart.     Mildred Montero PA-C  06/23/21 2058

## 2021-08-11 ENCOUNTER — LAB (OUTPATIENT)
Dept: LAB | Facility: CLINIC | Age: 21
End: 2021-08-11
Payer: COMMERCIAL

## 2021-08-11 DIAGNOSIS — E10.65 TYPE 1 DIABETES MELLITUS WITH HYPERGLYCEMIA (H): ICD-10-CM

## 2021-08-11 LAB
CHOLEST SERPL-MCNC: 104 MG/DL
CREAT UR-MCNC: 86 MG/DL
DEPRECATED CALCIDIOL+CALCIFEROL SERPL-MC: 19 UG/L (ref 20–75)
FASTING STATUS PATIENT QL REPORTED: YES
HBA1C MFR BLD: 7.7 % (ref 0–5.6)
HDLC SERPL-MCNC: 47 MG/DL
LDLC SERPL CALC-MCNC: 49 MG/DL
MICROALBUMIN UR-MCNC: <5 MG/L
MICROALBUMIN/CREAT UR: NORMAL MG/G{CREAT}
NONHDLC SERPL-MCNC: 57 MG/DL
T4 FREE SERPL-MCNC: 1.04 NG/DL (ref 0.76–1.46)
TRIGL SERPL-MCNC: 41 MG/DL
TSH SERPL DL<=0.005 MIU/L-ACNC: 0.78 MU/L (ref 0.4–4)

## 2021-08-11 PROCEDURE — 80061 LIPID PANEL: CPT

## 2021-08-11 PROCEDURE — 36415 COLL VENOUS BLD VENIPUNCTURE: CPT

## 2021-08-11 PROCEDURE — 83036 HEMOGLOBIN GLYCOSYLATED A1C: CPT

## 2021-08-11 PROCEDURE — 82306 VITAMIN D 25 HYDROXY: CPT

## 2021-08-11 PROCEDURE — 84443 ASSAY THYROID STIM HORMONE: CPT

## 2021-08-11 PROCEDURE — 84439 ASSAY OF FREE THYROXINE: CPT

## 2021-08-11 PROCEDURE — 82043 UR ALBUMIN QUANTITATIVE: CPT

## 2021-08-11 PROCEDURE — 83516 IMMUNOASSAY NONANTIBODY: CPT

## 2021-08-12 ENCOUNTER — TELEPHONE (OUTPATIENT)
Dept: ENDOCRINOLOGY | Facility: CLINIC | Age: 21
End: 2021-08-12

## 2021-08-12 ENCOUNTER — DOCUMENTATION ONLY (OUTPATIENT)
Dept: ENDOCRINOLOGY | Facility: CLINIC | Age: 21
End: 2021-08-12

## 2021-08-12 LAB
TTG IGA SER-ACNC: 0.6 U/ML
TTG IGG SER-ACNC: 0.9 U/ML

## 2021-08-12 NOTE — TELEPHONE ENCOUNTER
Mom and I talked on the phone to discuss lab results. Discussed that all lab results were normal, with the exception of the low vitamin D level. Per Dr. Littlejohn, she recommends Francisco take 2,000 units of Vit D oral daily, which can be found over the counter. We also discussed A1c result. Mom verbalized understanding of plan.    Nanda Reed RN  Pediatric Diabetes Educator  [phone]

## 2021-09-19 ENCOUNTER — HEALTH MAINTENANCE LETTER (OUTPATIENT)
Age: 21
End: 2021-09-19

## 2021-10-21 DIAGNOSIS — E10.65 TYPE 1 DIABETES MELLITUS WITH HYPERGLYCEMIA (H): Primary | ICD-10-CM

## 2021-12-31 DIAGNOSIS — E10.65 TYPE 1 DIABETES MELLITUS WITH HYPERGLYCEMIA (H): ICD-10-CM

## 2021-12-31 RX ORDER — INSULIN GLARGINE 100 [IU]/ML
INJECTION, SOLUTION SUBCUTANEOUS
Qty: 30 ML | Refills: 3 | Status: SHIPPED | OUTPATIENT
Start: 2021-12-31 | End: 2022-04-21

## 2022-01-09 ENCOUNTER — HEALTH MAINTENANCE LETTER (OUTPATIENT)
Age: 22
End: 2022-01-09

## 2022-03-03 NOTE — PATIENT INSTRUCTIONS
Type 1 Diabetes SCHOOL ORDERS    BLOOD GLUCOSE MONITORING  Target Range:   Test blood sugar Pre-meal and Pre-exercise.    Test if has symptoms of hypoglycemia or hyperglycemia.    Test per parent request (ie: end of school day before getting on the bus)    INSULIN given at school is Apidra/Humalog/Novolog via Insulin Pump  ~ USE DOSE CALCULATOR IN PUMP FOR ALL INSULIN DOSES,  Dose calculation based on food intake and current blood glucose    PUMP SETTINGS:  Insulin to Carb Ratio: 1 unit per 4.5 grams  Sensitivity (how much 1 unit lowers the blood sugar): 1 unit decreases blood sugar by 25 points, pump will add PRN  Target:     *Parent authorized to adjust insulin doses as needed.    Ketones:  Check for ketones when sick or vomiting  If the student has ketones, contact parents immediately because the child is either ill or there's a problem with the pump/pump infusion set.  The student will need insulin correction dose via syringe or insulin pen if parents/staff unable to to fix the pump problem within the hour of a pump problem. Use the correction dose above (for the pump) in an SQ injection PRN.    Glucagon Emergency SQ injection for unconscious hypoglycemia: 1 mg    Hypoglycemia (low blood glucose):  If your blood glucose is 51 to 80:  1.  Eat or drink 15 grams carbohydrate:   - 1/2 cup (4 ounces) juice or regular soda pop, or   - 1 cup (8 ounces) milk, or   - 3 to 4 glucose tablets  2.  Re-check your blood glucose in 15 minutes.  3.  Repeat these steps every 15 minutes until your blood glucose is above 100.    If your blood glucose is under 50:  1.  Eat or drink 30 grams carbohydrate:   - 1 cup (8 ounces) juice or regular soda pop, or   - 2 cups (16 ounces) milk, or   - 6 to 8 glucose tablets.  2.  Re-check your blood glucose in 15 minutes.  3.  Repeat these steps every 15 minutes until your blood glucose is above 100.      Contacting a doctor or a nurse:  You may contact your diabetes nurse with  any questions.   Call: Rosana Navarro @ [phone]  Your Provider is: Dr. Mike Liu  After business hours:  Call [phone] (TTY: [phone]).  Ask to speak with an endocrinologist (diabetes doctor).  A doctor is on-call 24 hours a day.  Fax: [phone]  CLINIC ADDRESS: [facility], [address] [place] [address]       20.5

## 2022-03-05 ENCOUNTER — HEALTH MAINTENANCE LETTER (OUTPATIENT)
Age: 22
End: 2022-03-05

## 2022-04-14 NOTE — PROGRESS NOTES
Pediatric Endocrinology Return Consultation:  Diabetes  :   Patient: Nicholas M Dubina MRN# [id number]   YOB: 2000 Age: 21 year old   Date of Visit: 4/21/2022  Dear  Referred Self:    I had the pleasure of seeing your patient, Nicholas M Dubina in the Pediatric Endocrinology Clinic, [facility], on 4/21/2022 for a return in-person consultation regarding type 1 diabetes.           Problem list:     Patient Active Problem List    Diagnosis Date Noted     Type 1 diabetes mellitus with hyperglycemia (H) 02/04/2016     Priority: Medium     Emesis 12/15/2013     Priority: Medium     Irritability and anger 09/22/2009     Priority: Medium     Followed by Dr. Prado  Initiating 504 plan.       Underweight 09/22/2009     Priority: Medium            HPI:   Oziel is a 21 year old male with Type 1 diabetes mellitus.    I have reviewed the available past laboratory evaluations, imaging studies, and medical records available to me at this visit. I have reviewed  Oziel' height and weight.    History was obtained from the patient and the medical record.    TODAY'S CONCERNS  1.  Annual studies will be due in August.  2.  Did he get the covid vaccine--no, not interested  3.  He has been running really low all the time, multiple times a day, getting down as low as 30's. Has not cut back on his insulin except that he only gives half of his carb coverage.    SOCIAL DETERMINANTS OF HEALTH IMPACTING HEALTH MANAGEMENT  History of multiple cancelled/failed appointments.    INTERPRETATION OF DIABETES TESTS  No meter today. Its not clear that he's actually testing except for when he is low.    A1c:  I independently ordered and interpreted HbA1c which is at target---but at the expense of too much hypoglycemia.  Today s hemoglobin A1c: 6.9  Previous two HbA1c results:   Lab Results   Component Value Date    A1C 7.7 08/11/2021    A1C 7.9 01/23/2020    A1C 7.8 04/18/2019      Result  was discussed at today's visit.     Current insulin regimen:   On Lantus:  Lantus 36 units  Humalog meal coverage 1 unit for every 8 grams  Humalog pre-meal correction 1 unit for every 30 above 120.    On the Medtronic 770 pump:  Basal 1.3 units per hour  Carb ratio 8--underestimates because he gets low  Sensitivity factor 30  Targets  ---midnight 120-130  ---7am   Active insulin 2  hr    Insulin administration site(s): arms    Family history and social history were reviewed and updated from last visit.          Past Medical History:     Past Medical History:   Diagnosis Date     Diabetes mellitus, type 1 3/11/2009     Irritability and anger 2009    Followed by Dr. Kam.  Initiating 504 plan.     Transitory tachypnea of      Level 2 nursery x 30 hours     Underweight 2009     Unspecified fetal and  jaundice     Peak bilirubin = 18.0. Received phototherapy            Past Surgical History:     Past Surgical History:   Procedure Laterality Date     NO HISTORY OF SURGERY                 Social History:     Social History     Social History Narrative    7th grade (2123-3617)        Environmental History    Child is around people that smoke: No     Child's home has well water: Yes    Child's home has filtered water:No    Child has pets in the home: 2 dogs outside and 1 inside cat    Child's home/apartment was built before 1950:No    Child attends :     Child has exposure to sibling/playmate with lead poisoning: No    Child has exposure to someone with tuberculosis: No    Child home have guns/firearms without trigger locks: Yes    Child home have guns/firearms with trigger locks: No    There are concerns about the child's exposure to violence in the home: May be concerned about violence that comes from other kids (outside the home).                Parent #1    Name: Deborah A. Dubina, ERIC, 66  Education: College   Occupation:     Parent #2    Name:  Edward S. Dubina, M, 4-1-65  Education: College   Occupation: Homemaker        Relationship Status of Parent(s):     Who does-he child live with? mother and father    What language(s) is/are spoken at home? English         August 2018. Lives in [place] with his parents, his 4 sisters and his sister's boyfriend.  Just started at [employer]. Interested in mechanical engineering.  Works part time at [employer]. Used to play hockey, no regular exercise now.        November 2018. 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 Eats a high protein diet, very little carb.        April 2019. Not currently working, though looking into going to school in the near future.        January 2020.  Just started at [employer] studying milling (precision metal work). Exercises by playing frisbee with his dog.  They live in [place].        June 2020.  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 His husky dog is 1 year old and they run together.        Feb 2021. Taking online classes. Eager to get started on the pump.        April 2022. Not planning on any more education.  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  Liked the pump but it kept falling up.  He upgraded to a 770 but hasn't started it yet.              Family History:     Family History   Problem Relation Age of Onset     Arthritis Mother         rheumatoid     Asthma Father      Allergies Father      Thyroid Disease Maternal Grandmother         hyper     Diabetes Other         type 1     C.A.D. Sister         prolonged QT; dysautonomia     Asthma Sister             Allergies:     Allergies   Allergen Reactions     Latex Other (See Comments)     SITE INFECTIONS             Medications:     Current Outpatient Rx   Medication Sig Dispense Refill     acetaminophen (TYLENOL) 500 MG tablet Take 1 tablet (500 mg) by mouth every 6 hours as needed 20  tablet 0     betamethasone valerate (VALISONE) 0.1 % ointment Apply topically 2 times daily 45 g 0     blood glucose (ACCU-CHEK GUIDE) test strip Use Accu-chek guide test strip to test blood sugar 6 times daily or as directed. 200 strip 11     blood glucose monitoring (MEG MICROLET) lancets Use to test blood sugar 6 times daily or as directed. 600 each 3     chlorhexidine (HIBICLENS) 4 % external liquid Use to clean off skin prior to pump site insertions. 120 mL 6     Continuous Blood Gluc Sensor (DEXCOM G6 SENSOR) MISC 1 each every 10 days 3 each 11     Continuous Blood Gluc Transmit (DEXCOM G6 TRANSMITTER) MISC 1 each every 3 months 1 each 3     CONTOUR NEXT TEST test strip Use to test blood sugar 6 times daily or as directed. 550 each 3     Glucagon (GVOKE PFS) 1 MG/0.2ML SOSY Inject 1 mg Subcutaneous as needed (Hypoglycemic seizure or unconsciousness) 2 Syringe 3     ibuprofen (ADVIL,MOTRIN) 200 MG tablet Take 400 mg by mouth every 4 hours as needed        insulin glargine (LANTUS SOLOSTAR) 100 UNIT/ML pen Inject 34 units daily 30 mL 3     insulin lispro (HUMALOG KWIKPEN) 100 UNIT/ML (1 unit dial) KWIKPEN Use up to 60 units per day when off of insulin pump 60 mL 3     insulin lispro (HUMALOG) 100 UNIT/ML vial Patient uses up to 100 units per day via insulin pump. 90 mL 3     insulin pen needle (B-D U/F) 31G X 5 MM miscellaneous Use 6 time(s) a day. 200 each 3     Insulin Pen Needle 31G X 4 MM MISC 6 each daily 200 each 11     INSULIN PUMP ACCESSORIES MISC None Entered       ketone blood test (PRECISION XTRA KETONE) STRP Check blood ketones when two consecutive blood sugars are greater than 300 and/or at times of illness/vomiting. 60 each 3     melatonin 3 MG tablet Take 3 mg by mouth nightly as needed.       ondansetron (ZOFRAN) 4 MG tablet Take 1 tablet (4 mg) by mouth every 8 hours as needed for nausea 6 tablet 0     Pediatric Multivit-Minerals-C (FLINTSTONES SOUR GUMMIES PO) Take 1 chew tab by mouth daily    "            Review of Systems:     Comprehensive ROS negative other than the symptoms noted above in the HPI.          Physical Exam:   Blood pressure 115/67, pulse 76, height 1.8 m (5' 10.87\"), weight 68 kg (149 lb 14.6 oz).  Growth percentile SmartLinks can only be used for patients less than 20 years old.  Height: 5' 10.866\", Facility age limit for growth percentiles is 20 years.  Weight: 149 lbs 14.6 oz, Facility age limit for growth percentiles is 20 years.  BMI: Body mass index is 20.99 kg/m ., Facility age limit for growth percentiles is 20 years.      CONSTITUTIONAL:   Awake, alert, and in no apparent distress.  HEAD: Normocephalic, without obvious abnormality.  EYES: Lids and lashes normal, sclera clear, conjunctiva normal.  ENT: external ears without lesions, nares clear, oral pharynx with moist mucus membranes.  NECK: Supple, symmetrical, trachea midline.  THYROID: symmetric, not enlarged and no tenderness.  HEMATOLOGIC/LYMPHATIC: No cervical lymphadenopathy.  ABDOMEN: Soft, non-distended, non-tender, no masses palpated, no hepatosplenomegally.  NEUROLOGIC:No focal deficits noted.   PSYCHIATRIC: Cooperative, no agitation.  SKIN: Insulin administration sites intact with bilateral arm lipohypertrophy. No acanthosis nigricans.  MUSCULOSKELETAL:  Full range of motion noted.  Motor strength and tone are normal.        Laboratory results:     TSH   Date Value Ref Range Status   08/11/2021 0.78 0.40 - 4.00 mU/L Final   08/30/2018 2.05 0.40 - 4.00 mU/L Final     Tissue Transglutaminase Antibody IgA   Date Value Ref Range Status   08/11/2021 0.6 <7.0 U/mL Final     Comment:     Negative- The tTG-IgA assay has limited utility for patients with decreased levels of IgA. Screening for celiac disease should include IgA testing to rule out selective IgA deficiency and to guide selection and interpretation of serological testing. tTG-IgG testing may be positive in celiac disease patients with IgA deficiency.   08/30/2018 " 1 <7 U/mL Final     Comment:     Negative  The tTG-IgA assay has limited utility for patients with decreased levels of   IgA. Screening for celiac disease should include IgA testing to rule out   selective IgA deficiency and to guide selection and interpretation of   serological testing. tTG-IgG testing may be positive in celiac disease   patients with IgA deficiency.       Tissue Transglutaminase Poonam IgG   Date Value Ref Range Status   08/30/2018 <1 <7 U/mL Final     Comment:     Negative     Tissue Transglutaminase Antibody IgG   Date Value Ref Range Status   08/11/2021 0.9 <7.0 U/mL Final     Comment:     Negative     Testosterone Total   Date Value Ref Range Status   12/04/2014 118 0 - 1,200 ng/dL Final     Cholesterol   Date Value Ref Range Status   08/11/2021 104 <200 mg/dL Final     Comment:     Age 0-19 years  Desirable: <170 mg/dL  Borderline high:  170-199 mg/dl  High:            >199 mg/dl    Age 20 years and older  Desirable: <200 mg/dL   08/30/2018 130 <170 mg/dL Final     Albumin Urine mg/L   Date Value Ref Range Status   08/11/2021 <5 mg/L Final   01/23/2020 18 mg/L Final     Triglycerides   Date Value Ref Range Status   08/11/2021 41 <150 mg/dL Final     Comment:     0-9 years:  Normal:    Less than 75 mg/dL  Borderline high:  75-99 mg/dL  High:             Greater than or equal to 100 mg/dL    0-19 years:  Normal:    Less than 90 mg/dL  Borderline high:   mg/dL  High:             Greater than or equal to 130 mg/dL    20 years and older:  Normal:    Less than 150 mg/dL  Borderline high:  150-199 mg/dL  High:             200-499 mg/dL  Very high:   Greater than or equal to 500 mg/dL   08/30/2018 107 (H) <90 mg/dL Final     Comment:     Borderline high:   mg/dl  High:            >129 mg/dl       HDL Cholesterol   Date Value Ref Range Status   08/30/2018 41 (L) >45 mg/dL Final     Comment:     Low:             <40 mg/dl  Borderline low:   40-45 mg/dl       Direct Measure HDL   Date Value Ref  Range Status   08/11/2021 47 >=40 mg/dL Final     Comment:     0-19 years:       Greater than or equal to 45 mg/dL   Low: Less than 40 mg/dL   Borderline low: 40-44 mg/dL     20 years and older:   Female: Greater than or equal to 50 mg/dL   Male:   Greater than or equal to 40 mg/dL          LDL Cholesterol Calculated   Date Value Ref Range Status   08/11/2021 49 <=100 mg/dL Final     Comment:     Age 0-19 years:  Desirable: 0-110 mg/dL   Borderline high: 110-129 mg/dL   High: >= 130 mg/dL    Age 20 years and older:  Desirable: <100mg/dL  Above desirable: 100-129 mg/dL   Borderline high: 130-159 mg/dL   High: 160-189 mg/dL   Very high: >= 190 mg/dL   08/30/2018 68 <110 mg/dL Final     Cholesterol/HDL Ratio   Date Value Ref Range Status   12/04/2014 2.3 0.0 - 5.0 Final     Non HDL Cholesterol   Date Value Ref Range Status   08/11/2021 57 <130 mg/dL Final     Comment:     0-19 years:  Desirable:        Less than 120 mg/dL  Borderline high:  120-144 mg/dL  High:                 Greater than or equal to 145 mg/dL    20 years and older:  Desirable:        130 mg/dL  Above Desirable:130-159 mg/dL  Borderline high:  160-189 mg/dL  High:             190-219 mg/dL  Very high:   Greater than or equal to 220 mg/dL   08/30/2018 89 <120 mg/dL Final     Lab Results   Component Value Date    A1C 7.7 08/11/2021    A1C 7.9 01/23/2020    A1C 7.8 04/18/2019    A1C 8.9 11/15/2018    A1C 10.3 10/04/2018    A1C 10.3 10/04/2018      Lab Results   Component Value Date    HEMOGLOBINA1 10.1 09/15/2011    HEMOGLOBINA1 10.0 06/30/2011    HEMOGLOBINA1 9.9 03/10/2011    HEMOGLOBINA1 10.6 12/09/2010    HEMOGLOBINA1 11.0 10/14/2010         Diabetes Health Maintenance    Date of Diabetes Diagnosis:  2009, age 9  Type of Diabetes:  Type 1  Antibodies done (yes/no): unknown  Dates of Episodes DKA (month/year, cumulative excluding diagnosis, ongoing, assess each visit):   Dates of Episodes Severe* Hypoglycemia (month/year, cumulative, ongoing, assess  each visit) *Severe=patient unconscious, seizure, unable to help self:   Date Last Saw Psychologist:     Date Last Saw Dietitian:     Date Last Eye Exam and location:   Date Last Flu Shot (note if refused): refused  Celiac?  Annual Lab Studies----  Celiac Screen (annual): last screened 8/2021  Thyroid (every 2 years): last screened 8/2021  Lipids (every 5 years): last screened 8/2021 (LDL 49)   Urine Microalbumin (annual): last screened 8/2021  Vitamin D (annual): last screened 8/2021  Date of Last Visit:  Feb 2021 (virtual)    IgA Deficient (yes/no, date screened):   IGA   Date Value Ref Range Status   03/04/2010 163 45 - 235 mg/dL Final     Celiac Screen (annual):   Tissue Transglutaminase Antibody IgA   Date Value Ref Range Status   08/11/2021 0.6 <7.0 U/mL Final     Comment:     Negative- The tTG-IgA assay has limited utility for patients with decreased levels of IgA. Screening for celiac disease should include IgA testing to rule out selective IgA deficiency and to guide selection and interpretation of serological testing. tTG-IgG testing may be positive in celiac disease patients with IgA deficiency.   08/30/2018 1 <7 U/mL Final     Comment:     Negative  The tTG-IgA assay has limited utility for patients with decreased levels of   IgA. Screening for celiac disease should include IgA testing to rule out   selective IgA deficiency and to guide selection and interpretation of   serological testing. tTG-IgG testing may be positive in celiac disease   patients with IgA deficiency.       Thyroid (every 2 years):   TSH   Date Value Ref Range Status   08/11/2021 0.78 0.40 - 4.00 mU/L Final   08/30/2018 2.05 0.40 - 4.00 mU/L Final      T4 Free   Date Value Ref Range Status   08/30/2018 0.99 0.76 - 1.46 ng/dL Final     Free T4   Date Value Ref Range Status   08/11/2021 1.04 0.76 - 1.46 ng/dL Final     Lipids (every 5 years age 10 and older):   Recent Labs   Lab Test 08/11/21  1048 08/30/18  1202 06/02/16  1134  12/04/14  1342 08/21/14  1019   CHOL 104 130   < > 152 139   HDL 47 41*   < > 65 72   LDL 49 68   < > 73 60   TRIG 41 107*   < > 71 34   CHOLHDLRATIO  --   --   --  2.3 1.9    < > = values in this interval not displayed.       Today's PHQ-2 Mental Health Survey Score (every visit age 10 and older depression screening):   PHQ-2 Score:     PHQ-2 ( 1999 Pfizer) 6/25/2020 4/18/2019   Q1: Little interest or pleasure in doing things 0 0   Q2: Feeling down, depressed or hopeless 0 0   PHQ-2 Score 0 0   PHQ-2 Total Score (12-17 Years)- Positive if 3 or more points; Administer PHQ-A if positive 0 0              Assessment and Plan:   Oziel is a 21 year old male with type 1 diabetes and hypoglycemia. It is time to transition him to adult endo.     Diabetes is a complicated and dangerous illness which requires intensive monitoring and treatment to prevent both short-term and long-term consequences to various organs. Insulin therapy is life-saving, but is also associated with life-threatening toxicity (hypoglycemia).  Careful and continuous attention to balancing glucose levels, activity, diet and insulin dosage is necessary.    I have reviewed the data and the therapy plan with the patient, and with the diabetes nurse educator who will communicate with the patient between visits to adjust insulin as needed.      Patient Instructions        Thank you for choosing [facility].     Maurice Littlejohn MD    It was a pleasure talking to you today! This visit note is available to you in [employer]. If you see any errors or changes/additions you would like me to make to the note please let me know.    We talked about transferring you to adult endocrinologist---I put in a referral for [facility] for an appointment 3 months from now.  In the meantime stay in contact with us, especially as we are doing insulin adjustment.    You are way too low and I have cut back on your insulin.  Please try this new dose and then  give us a call---if you are still getting low we will cut back further, or if you are high we may need to split the difference.    You have an expensive, sophisticated pump at home. I know you are worried about it adhering but my suggestion is to give it a try.  I will write out pump doses.    Avoid those built up sites on your arms.    It has been a pleasure working with you---good luck with your future plans!    YOUR INSULIN DOSE IS:  Lantus 30 units (down from 26)  Novolog meal coverage 1 per 15 grams (from 1 per 8)  Novolog correction 1 for every 50 above 150    On the PUMP (Medtronic 770)  Basal rate 1.25 units per hour  Carb ratio 15  Sensitivity 50  Target 110  Active insulin 2 hours    ASick Day Plan:  Pump Failure:  IF YOUR PUMP FAILS AND YOU NEED TO TAKE BASAL INSULIN (GLARGINE, BASAGLAR, TRESIBA, LEVEMIR) THE DOSE IS: 30 units  Remember when you restart your pump that the basal insulin lasts 24 hours so wait until 24 hours is up before starting your pump basal rate.Call on-call endocrinologist or diabetes nurse if this happens. You should also plan to call the pump company right away to troubleshoot the pump failure.    Hyperglycemia (high blood glucose):  Ketones:  Check urine/blood ketones if Oziel is sick, vomiting, or if blood glucose is above 240 twice in a row. Call on-call endocrinologist or diabetes nurse if ketones are present.    Hypoglycemia (low blood glucose):  If blood glucose is 60 to 80:  1.  Eat or drink 1 carb unit (15 grams carbohydrate).   One carb unit equals:   - 1/2 cup (4 ounces) juice or regular soda pop, or   - 1 cup (8 ounces) milk, or   - 3 to 4 glucose tablets  2.  Re-check your blood glucose in 15 minutes.  3.  Repeat these steps every 15 minutes until your blood glucose is above 100.    If blood glucose is under 60:  1.  Eat or drink 2 carb units (30 grams carbohydrate).  Two carb units equal:   - 1 cup (8 ounces) juice or regular soda pop, or   - 2 cups (16 ounces) milk,  or   - 6 to 8 glucose tablets.  2.  Re-check your blood glucose in 15 minutes.  3.  Repeat these steps every 15 minutes until your blood glucose is above 100.    For urgent issues that cannot wait until the next business day, call [phone] and ask for the Pediatric Endocrinologist on call.    You may contact the diabetes nurses with any questions at [phone].  Jenna Stover, RN; Randi Dasilva, RN, BSN; Elvia Navarro, RN; or Nanda Lino RN, BAN may answer, depending on the day. Calls will be returned as soon as possible.      Medication renewal requests must be faxed to the main office by your pharmacy.  Allow 3-4 days for completion.   Main Office: [phone]  Fax: [phone]    Scheduling:    Pediatric Call Center for [facility], [phone]  We encourage you to sign up for [email or web address] for easy communication with us.  Sign up at the clinic  or go to [email or web address].     Please try the Pass[facility] to [facility] phone application for Virtual Tours, Procedure Preparation, Resources, Preparation for Hospital Stay and the Coloring Board.         Thank you for allowing me to participate in the care of your patient.  Please do not hesitate to call with questions or concerns.    Sincerely,    Farzana Littlejohn MD  Professor and   Pediatric Endocrinology  [facility]    CC  SELF, REFERRED    40 min were spent on the date of the encounter in chart review, patient visit, review of tests, documentation and discussion with the diabetes nurse educator about the issues documented above.

## 2022-04-21 ENCOUNTER — OFFICE VISIT (OUTPATIENT)
Dept: ENDOCRINOLOGY | Facility: CLINIC | Age: 22
End: 2022-04-21
Attending: PEDIATRICS
Payer: COMMERCIAL

## 2022-04-21 VITALS
BODY MASS INDEX: 20.99 KG/M2 | SYSTOLIC BLOOD PRESSURE: 115 MMHG | WEIGHT: 149.91 LBS | HEART RATE: 76 BPM | HEIGHT: 71 IN | DIASTOLIC BLOOD PRESSURE: 67 MMHG

## 2022-04-21 DIAGNOSIS — E10.65 TYPE 1 DIABETES MELLITUS WITH HYPERGLYCEMIA (H): Primary | ICD-10-CM

## 2022-04-21 DIAGNOSIS — E10.65 TYPE 1 DIABETES MELLITUS WITH HYPERGLYCEMIA (H): ICD-10-CM

## 2022-04-21 LAB — HBA1C MFR BLD: 6.9 % (ref 0–5.7)

## 2022-04-21 PROCEDURE — G0463 HOSPITAL OUTPT CLINIC VISIT: HCPCS

## 2022-04-21 PROCEDURE — 83036 HEMOGLOBIN GLYCOSYLATED A1C: CPT | Performed by: PEDIATRICS

## 2022-04-21 PROCEDURE — 99215 OFFICE O/P EST HI 40 MIN: CPT | Performed by: PEDIATRICS

## 2022-04-21 PROCEDURE — G0108 DIAB MANAGE TRN  PER INDIV: HCPCS | Performed by: DIETITIAN, REGISTERED

## 2022-04-21 RX ORDER — INSULIN GLARGINE 100 [IU]/ML
INJECTION, SOLUTION SUBCUTANEOUS
Qty: 30 ML | Refills: 3 | Status: SHIPPED | OUTPATIENT
Start: 2022-04-21 | End: 2022-10-11

## 2022-04-21 RX ORDER — INSULIN LISPRO 100 [IU]/ML
INJECTION, SOLUTION INTRAVENOUS; SUBCUTANEOUS
Qty: 60 ML | Refills: 3 | Status: SHIPPED | OUTPATIENT
Start: 2022-04-21 | End: 2022-10-11

## 2022-04-21 ASSESSMENT — PAIN SCALES - GENERAL: PAINLEVEL: NO PAIN (0)

## 2022-04-21 NOTE — LETTER
4/21/2022      RE: Nicholas M Dubina  [address] [place] [id number]       Pediatric Endocrinology Return Consultation:  Diabetes  :   Patient: Nicholas M Dubina MRN# [id number]   YOB: 2000 Age: 21 year old   Date of Visit: 4/21/2022  Dear Dr. Lawrence Self:    I had the pleasure of seeing your patient, Nicholas M Dubina in the Pediatric Endocrinology Clinic, [facility], on 4/21/2022 for a return in-person consultation regarding type 1 diabetes.           Problem list:     Patient Active Problem List    Diagnosis Date Noted     Type 1 diabetes mellitus with hyperglycemia (H) 02/04/2016     Priority: Medium     Emesis 12/15/2013     Priority: Medium     Irritability and anger 09/22/2009     Priority: Medium     Followed by Dr. Kam.  Initiating 504 plan.       Underweight 09/22/2009     Priority: Medium            HPI:   Oziel is a 21 year old male with Type 1 diabetes mellitus.    I have reviewed the available past laboratory evaluations, imaging studies, and medical records available to me at this visit. I have reviewed  Oziel' height and weight.    History was obtained from the patient and the medical record.    TODAY'S CONCERNS  1.  Annual studies will be due in August.  2.  Did he get the covid vaccine--no, not interested  3.  He has been running really low all the time, multiple times a day, getting down as low as 30's. Has not cut back on his insulin except that he only gives half of his carb coverage.    SOCIAL DETERMINANTS OF HEALTH IMPACTING HEALTH MANAGEMENT  History of multiple cancelled/failed appointments.    INTERPRETATION OF DIABETES TESTS  No meter today. Its not clear that he's actually testing except for when he is low.    A1c:  I independently ordered and interpreted HbA1c which is at target---but at the expense of too much hypoglycemia.  Today s hemoglobin A1c: 6.9  Previous two HbA1c results:   Lab Results   Component Value Date     A1C 7.7 2021    A1C 7.9 2020    A1C 7.8 2019      Result was discussed at today's visit.     Current insulin regimen:   On Lantus:  Lantus 36 units  Humalog meal coverage 1 unit for every 8 grams  Humalog pre-meal correction 1 unit for every 30 above 120.    On the Medtronic 770 pump:  Basal 1.3 units per hour  Carb ratio 8--underestimates because he gets low  Sensitivity factor 30  Targets  ---midnight 120-130  ---7am   Active insulin 2  hr    Insulin administration site(s): arms    Family history and social history were reviewed and updated from last visit.          Past Medical History:     Past Medical History:   Diagnosis Date     Diabetes mellitus, type 1 3/11/2009     Irritability and anger 2009    Followed by Dr. Kam.  Initiating 504 plan.     Transitory tachypnea of      Level 2 nursery x 30 hours     Underweight 2009     Unspecified fetal and  jaundice     Peak bilirubin = 18.0. Received phototherapy            Past Surgical History:     Past Surgical History:   Procedure Laterality Date     NO HISTORY OF SURGERY                 Social History:     Social History     Social History Narrative    7th grade (6033-5128)        Environmental History    Child is around people that smoke: No     Child's home has well water: Yes    Child's home has filtered water:No    Child has pets in the home: 2 dogs outside and 1 inside cat    Child's home/apartment was built before 1950:No    Child attends :     Child has exposure to sibling/playmate with lead poisoning: No    Child has exposure to someone with tuberculosis: No    Child home have guns/firearms without trigger locks: Yes    Child home have guns/firearms with trigger locks: No    There are concerns about the child's exposure to violence in the home: May be concerned about violence that comes from other kids (outside the home).                Parent #1    Name: Deborah A. Dubina, F, 39[birth year]13-04   Education: College   Occupation:     Parent #2    Name: Edward S. Dubina, M, 4-1-65  Education: College   Occupation: Homemaker        Relationship Status of Parent(s):     Who does-he child live with? mother and father    What language(s) is/are spoken at home? English         August 2018. Lives in [place] with his parents, his 4 sisters and his sister's boyfriend.  Just started at [employer]. Interested in mechanical engineering.  Works part time at [employer]. Used to play hockey, no regular exercise now.        November 2018. Works on a [employer] from about noon-4, saving money to go to community school. Eats a high protein diet, very little carb.        April 2019. Not currently working, though looking into going to school in the near future.        January 2020.  Just started at [employer] studying milling (precision metal work). Exercises by playing frisbee with his dog.  They live in [place].        June 2020.  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 His husky dog is 1 year old and they run together.        Feb 2021. Taking online classes. Eager to get started on the pump.        April 2022. Not planning on any more education.  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  Liked the pump but it kept falling up.  He upgraded to a 770 but hasn't started it yet.              Family History:     Family History   Problem Relation Age of Onset     Arthritis Mother         rheumatoid     Asthma Father      Allergies Father      Thyroid Disease Maternal Grandmother         hyper     Diabetes Other         type 1     C.A.D. Sister         prolonged QT; dysautonomia     Asthma Sister             Allergies:     Allergies   Allergen Reactions     Latex Other (See Comments)     SITE INFECTIONS             Medications:     Current Outpatient Rx   Medication Sig Dispense Refill     acetaminophen (TYLENOL)  500 MG tablet Take 1 tablet (500 mg) by mouth every 6 hours as needed 20 tablet 0     betamethasone valerate (VALISONE) 0.1 % ointment Apply topically 2 times daily 45 g 0     blood glucose (ACCU-CHEK GUIDE) test strip Use Accu-chek guide test strip to test blood sugar 6 times daily or as directed. 200 strip 11     blood glucose monitoring (MEG MICROLET) lancets Use to test blood sugar 6 times daily or as directed. 600 each 3     chlorhexidine (HIBICLENS) 4 % external liquid Use to clean off skin prior to pump site insertions. 120 mL 6     Continuous Blood Gluc Sensor (DEXCOM G6 SENSOR) MISC 1 each every 10 days 3 each 11     Continuous Blood Gluc Transmit (DEXCOM G6 TRANSMITTER) MISC 1 each every 3 months 1 each 3     CONTOUR NEXT TEST test strip Use to test blood sugar 6 times daily or as directed. 550 each 3     Glucagon (GVOKE PFS) 1 MG/0.2ML SOSY Inject 1 mg Subcutaneous as needed (Hypoglycemic seizure or unconsciousness) 2 Syringe 3     ibuprofen (ADVIL,MOTRIN) 200 MG tablet Take 400 mg by mouth every 4 hours as needed        insulin glargine (LANTUS SOLOSTAR) 100 UNIT/ML pen Inject 34 units daily 30 mL 3     insulin lispro (HUMALOG KWIKPEN) 100 UNIT/ML (1 unit dial) KWIKPEN Use up to 60 units per day when off of insulin pump 60 mL 3     insulin lispro (HUMALOG) 100 UNIT/ML vial Patient uses up to 100 units per day via insulin pump. 90 mL 3     insulin pen needle (B-D U/F) 31G X 5 MM miscellaneous Use 6 time(s) a day. 200 each 3     Insulin Pen Needle 31G X 4 MM MISC 6 each daily 200 each 11     INSULIN PUMP ACCESSORIES MISC None Entered       ketone blood test (PRECISION XTRA KETONE) STRP Check blood ketones when two consecutive blood sugars are greater than 300 and/or at times of illness/vomiting. 60 each 3     melatonin 3 MG tablet Take 3 mg by mouth nightly as needed.       ondansetron (ZOFRAN) 4 MG tablet Take 1 tablet (4 mg) by mouth every 8 hours as needed for nausea 6 tablet 0     Pediatric  "Multivit-Minerals-C (FLINTSTONES SOUR GUMMIES PO) Take 1 chew tab by mouth daily               Review of Systems:     Comprehensive ROS negative other than the symptoms noted above in the HPI.          Physical Exam:   Blood pressure 115/67, pulse 76, height 1.8 m (5' 10.87\"), weight 68 kg (149 lb 14.6 oz).  Growth percentile SmartLinks can only be used for patients less than 20 years old.  Height: 5' 10.866\", Facility age limit for growth percentiles is 20 years.  Weight: 149 lbs 14.6 oz, Facility age limit for growth percentiles is 20 years.  BMI: Body mass index is 20.99 kg/m ., Facility age limit for growth percentiles is 20 years.      CONSTITUTIONAL:   Awake, alert, and in no apparent distress.  HEAD: Normocephalic, without obvious abnormality.  EYES: Lids and lashes normal, sclera clear, conjunctiva normal.  ENT: external ears without lesions, nares clear, oral pharynx with moist mucus membranes.  NECK: Supple, symmetrical, trachea midline.  THYROID: symmetric, not enlarged and no tenderness.  HEMATOLOGIC/LYMPHATIC: No cervical lymphadenopathy.  ABDOMEN: Soft, non-distended, non-tender, no masses palpated, no hepatosplenomegally.  NEUROLOGIC:No focal deficits noted.   PSYCHIATRIC: Cooperative, no agitation.  SKIN: Insulin administration sites intact with bilateral arm lipohypertrophy. No acanthosis nigricans.  MUSCULOSKELETAL:  Full range of motion noted.  Motor strength and tone are normal.        Laboratory results:     TSH   Date Value Ref Range Status   08/11/2021 0.78 0.40 - 4.00 mU/L Final   08/30/2018 2.05 0.40 - 4.00 mU/L Final     Tissue Transglutaminase Antibody IgA   Date Value Ref Range Status   08/11/2021 0.6 <7.0 U/mL Final     Comment:     Negative- The tTG-IgA assay has limited utility for patients with decreased levels of IgA. Screening for celiac disease should include IgA testing to rule out selective IgA deficiency and to guide selection and interpretation of serological testing. tTG-IgG " testing may be positive in celiac disease patients with IgA deficiency.   08/30/2018 1 <7 U/mL Final     Comment:     Negative  The tTG-IgA assay has limited utility for patients with decreased levels of   IgA. Screening for celiac disease should include IgA testing to rule out   selective IgA deficiency and to guide selection and interpretation of   serological testing. tTG-IgG testing may be positive in celiac disease   patients with IgA deficiency.       Tissue Transglutaminase Poonam IgG   Date Value Ref Range Status   08/30/2018 <1 <7 U/mL Final     Comment:     Negative     Tissue Transglutaminase Antibody IgG   Date Value Ref Range Status   08/11/2021 0.9 <7.0 U/mL Final     Comment:     Negative     Testosterone Total   Date Value Ref Range Status   12/04/2014 118 0 - 1,200 ng/dL Final     Cholesterol   Date Value Ref Range Status   08/11/2021 104 <200 mg/dL Final     Comment:     Age 0-19 years  Desirable: <170 mg/dL  Borderline high:  170-199 mg/dl  High:            >199 mg/dl    Age 20 years and older  Desirable: <200 mg/dL   08/30/2018 130 <170 mg/dL Final     Albumin Urine mg/L   Date Value Ref Range Status   08/11/2021 <5 mg/L Final   01/23/2020 18 mg/L Final     Triglycerides   Date Value Ref Range Status   08/11/2021 41 <150 mg/dL Final     Comment:     0-9 years:  Normal:    Less than 75 mg/dL  Borderline high:  75-99 mg/dL  High:             Greater than or equal to 100 mg/dL    0-19 years:  Normal:    Less than 90 mg/dL  Borderline high:   mg/dL  High:             Greater than or equal to 130 mg/dL    20 years and older:  Normal:    Less than 150 mg/dL  Borderline high:  150-199 mg/dL  High:             200-499 mg/dL  Very high:   Greater than or equal to 500 mg/dL   08/30/2018 107 (H) <90 mg/dL Final     Comment:     Borderline high:   mg/dl  High:            >129 mg/dl       HDL Cholesterol   Date Value Ref Range Status   08/30/2018 41 (L) >45 mg/dL Final     Comment:     Low:              <40 mg/dl  Borderline low:   40-45 mg/dl       Direct Measure HDL   Date Value Ref Range Status   08/11/2021 47 >=40 mg/dL Final     Comment:     0-19 years:       Greater than or equal to 45 mg/dL   Low: Less than 40 mg/dL   Borderline low: 40-44 mg/dL     20 years and older:   Female: Greater than or equal to 50 mg/dL   Male:   Greater than or equal to 40 mg/dL          LDL Cholesterol Calculated   Date Value Ref Range Status   08/11/2021 49 <=100 mg/dL Final     Comment:     Age 0-19 years:  Desirable: 0-110 mg/dL   Borderline high: 110-129 mg/dL   High: >= 130 mg/dL    Age 20 years and older:  Desirable: <100mg/dL  Above desirable: 100-129 mg/dL   Borderline high: 130-159 mg/dL   High: 160-189 mg/dL   Very high: >= 190 mg/dL   08/30/2018 68 <110 mg/dL Final     Cholesterol/HDL Ratio   Date Value Ref Range Status   12/04/2014 2.3 0.0 - 5.0 Final     Non HDL Cholesterol   Date Value Ref Range Status   08/11/2021 57 <130 mg/dL Final     Comment:     0-19 years:  Desirable:        Less than 120 mg/dL  Borderline high:  120-144 mg/dL  High:                 Greater than or equal to 145 mg/dL    20 years and older:  Desirable:        130 mg/dL  Above Desirable:130-159 mg/dL  Borderline high:  160-189 mg/dL  High:             190-219 mg/dL  Very high:   Greater than or equal to 220 mg/dL   08/30/2018 89 <120 mg/dL Final     Lab Results   Component Value Date    A1C 7.7 08/11/2021    A1C 7.9 01/23/2020    A1C 7.8 04/18/2019    A1C 8.9 11/15/2018    A1C 10.3 10/04/2018    A1C 10.3 10/04/2018      Lab Results   Component Value Date    HEMOGLOBINA1 10.1 09/15/2011    HEMOGLOBINA1 10.0 06/30/2011    HEMOGLOBINA1 9.9 03/10/2011    HEMOGLOBINA1 10.6 12/09/2010    HEMOGLOBINA1 11.0 10/14/2010         Diabetes Health Maintenance    Date of Diabetes Diagnosis:  2009, age 9  Type of Diabetes:  Type 1  Antibodies done (yes/no): unknown  Dates of Episodes DKA (month/year, cumulative excluding diagnosis, ongoing, assess each  visit):   Dates of Episodes Severe* Hypoglycemia (month/year, cumulative, ongoing, assess each visit) *Severe=patient unconscious, seizure, unable to help self:   Date Last Saw Psychologist:     Date Last Saw Dietitian:     Date Last Eye Exam and location:   Date Last Flu Shot (note if refused): refused  Celiac?  Annual Lab Studies----  Celiac Screen (annual): last screened 8/2021  Thyroid (every 2 years): last screened 8/2021  Lipids (every 5 years): last screened 8/2021 (LDL 49)   Urine Microalbumin (annual): last screened 8/2021  Vitamin D (annual): last screened 8/2021  Date of Last Visit:  Feb 2021 (virtual)    IgA Deficient (yes/no, date screened):   IGA   Date Value Ref Range Status   03/04/2010 163 45 - 235 mg/dL Final     Celiac Screen (annual):   Tissue Transglutaminase Antibody IgA   Date Value Ref Range Status   08/11/2021 0.6 <7.0 U/mL Final     Comment:     Negative- The tTG-IgA assay has limited utility for patients with decreased levels of IgA. Screening for celiac disease should include IgA testing to rule out selective IgA deficiency and to guide selection and interpretation of serological testing. tTG-IgG testing may be positive in celiac disease patients with IgA deficiency.   08/30/2018 1 <7 U/mL Final     Comment:     Negative  The tTG-IgA assay has limited utility for patients with decreased levels of   IgA. Screening for celiac disease should include IgA testing to rule out   selective IgA deficiency and to guide selection and interpretation of   serological testing. tTG-IgG testing may be positive in celiac disease   patients with IgA deficiency.       Thyroid (every 2 years):   TSH   Date Value Ref Range Status   08/11/2021 0.78 0.40 - 4.00 mU/L Final   08/30/2018 2.05 0.40 - 4.00 mU/L Final      T4 Free   Date Value Ref Range Status   08/30/2018 0.99 0.76 - 1.46 ng/dL Final     Free T4   Date Value Ref Range Status   08/11/2021 1.04 0.76 - 1.46 ng/dL Final     Lipids (every 5 years age 10  and older):   Recent Labs   Lab Test 08/11/21  1048 08/30/18  1202 06/02/16  1134 12/04/14  1342 08/21/14  1019   CHOL 104 130   < > 152 139   HDL 47 41*   < > 65 72   LDL 49 68   < > 73 60   TRIG 41 107*   < > 71 34   CHOLHDLRATIO  --   --   --  2.3 1.9    < > = values in this interval not displayed.       Today's PHQ-2 Mental Health Survey Score (every visit age 10 and older depression screening):   PHQ-2 Score:     PHQ-2 ( 1999 Pfizer) 6/25/2020 4/18/2019   Q1: Little interest or pleasure in doing things 0 0   Q2: Feeling down, depressed or hopeless 0 0   PHQ-2 Score 0 0   PHQ-2 Total Score (12-17 Years)- Positive if 3 or more points; Administer PHQ-A if positive 0 0              Assessment and Plan:   Oziel is a 21 year old male with type 1 diabetes and hypoglycemia. It is time to transition him to adult endo.     Diabetes is a complicated and dangerous illness which requires intensive monitoring and treatment to prevent both short-term and long-term consequences to various organs. Insulin therapy is life-saving, but is also associated with life-threatening toxicity (hypoglycemia).  Careful and continuous attention to balancing glucose levels, activity, diet and insulin dosage is necessary.    I have reviewed the data and the therapy plan with the patient, and with the diabetes nurse educator who will communicate with the patient between visits to adjust insulin as needed.      Patient Instructions        Thank you for choosing [facility].     Maurice Littlejohn MD    It was a pleasure talking to you today! This visit note is available to you in [employer]. If you see any errors or changes/additions you would like me to make to the note please let me know.    We talked about transferring you to adult endocrinologist---I put in a referral for [facility] for an appointment 3 months from now.  In the meantime stay in contact with us, especially as we are doing insulin adjustment.    You are  way too low and I have cut back on your insulin.  Please try this new dose and then give us a call---if you are still getting low we will cut back further, or if you are high we may need to split the difference.    You have an expensive, sophisticated pump at home. I know you are worried about it adhering but my suggestion is to give it a try.  I will write out pump doses.    Avoid those built up sites on your arms.    It has been a pleasure working with you---good luck with your future plans!    YOUR INSULIN DOSE IS:  Lantus 30 units (down from 26)  Novolog meal coverage 1 per 15 grams (from 1 per 8)  Novolog correction 1 for every 50 above 150    On the PUMP (Medtronic 770)  Basal rate 1.25 units per hour  Carb ratio 15  Sensitivity 50  Target 110  Active insulin 2 hours    ASick Day Plan:  Pump Failure:  IF YOUR PUMP FAILS AND YOU NEED TO TAKE BASAL INSULIN (GLARGINE, BASAGLAR, TRESIBA, LEVEMIR) THE DOSE IS: 30 units  Remember when you restart your pump that the basal insulin lasts 24 hours so wait until 24 hours is up before starting your pump basal rate.Call on-call endocrinologist or diabetes nurse if this happens. You should also plan to call the pump company right away to troubleshoot the pump failure.    Hyperglycemia (high blood glucose):  Ketones:  Check urine/blood ketones if Oziel is sick, vomiting, or if blood glucose is above 240 twice in a row. Call on-call endocrinologist or diabetes nurse if ketones are present.    Hypoglycemia (low blood glucose):  If blood glucose is 60 to 80:  1.  Eat or drink 1 carb unit (15 grams carbohydrate).   One carb unit equals:   - 1/2 cup (4 ounces) juice or regular soda pop, or   - 1 cup (8 ounces) milk, or   - 3 to 4 glucose tablets  2.  Re-check your blood glucose in 15 minutes.  3.  Repeat these steps every 15 minutes until your blood glucose is above 100.    If blood glucose is under 60:  1.  Eat or drink 2 carb units (30 grams carbohydrate).  Two carb units  equal:   - 1 cup (8 ounces) juice or regular soda pop, or   - 2 cups (16 ounces) milk, or   - 6 to 8 glucose tablets.  2.  Re-check your blood glucose in 15 minutes.  3.  Repeat these steps every 15 minutes until your blood glucose is above 100.    For urgent issues that cannot wait until the next business day, call [phone] and ask for the Pediatric Endocrinologist on call.    You may contact the diabetes nurses with any questions at [phone].  Jenna Stover, SAMARA; Randi Dasilva, RN, BSN; Elvia Navarro, RN; or Nanda Lino RN, BAN may answer, depending on the day. Calls will be returned as soon as possible.      Medication renewal requests must be faxed to the main office by your pharmacy.  Allow 3-4 days for completion.   Main Office: [phone]  Fax: [phone]    Scheduling:    Pediatric Call Center for [facility], [phone]  We encourage you to sign up for [employer] for easy communication with us.  Sign up at the clinic  or go to [email or web address].     Please try the Passport to [facility] ([facility]) phone application for Virtual Tours, Procedure Preparation, Resources, Preparation for Hospital Stay and the Coloring Board.         Thank you for allowing me to participate in the care of your patient.  Please do not hesitate to call with questions or concerns.    Sincerely,    Farzana Littlejohn MD  Professor and   Pediatric Endocrinology  [facility]    CC  SELF, REFERRED    40 min were spent on the date of the encounter in chart review, patient visit, review of tests, documentation and discussion with the diabetes nurse educator about the issues documented above.

## 2022-04-21 NOTE — LETTER
"4/21/2022      RE: Nicholas M Dubina  [address] [place] [id number]     Dear Colleague,    Thank you for the opportunity to participate in the care of your patient, Nicholas M Dubina, at the [facility] at [facility]. Please see a copy of my visit note below.    Diabetes Self Management Training: Individual Review Visit    Nicholas M Dubina presents today for education related to Type 1 diabetes.    He is accompanied by mother    Patient Problem List and Family Medical History reviewed for relevant medical history, current medical status, and diabetes risk factors.    Current Diabetes Management per Patient:  Taking diabetes medications?   yes:     Diabetes Medication(s)     Diabetic Other       Glucagon (GVOKE PFS) 1 MG/0.2ML SOSY    Inject 1 mg Subcutaneous as needed (Hypoglycemic seizure or unconsciousness)     glucagon 1 MG injection (Discontinued)    Inject 1 mg into the muscle once For unconscious hypoglycemia only - dispense 2 kits (1 home and 1 school)    Insulin       insulin glargine (LANTUS SOLOSTAR) 100 UNIT/ML pen    Inject 34 units daily     insulin lispro (HUMALOG KWIKPEN) 100 UNIT/ML (1 unit dial) KWIKPEN    Use up to 60 units per day when off of insulin pump     insulin lispro (HUMALOG) 100 UNIT/ML vial    Patient uses up to 100 units per day via insulin pump.          Past Diabetes Education: Yes    Patient glucose self monitoring as follows: no bg meter today.     Vitals:  There were no vitals taken for this visit.  Estimated body mass index is 20.99 kg/m  as calculated from the following:    Height as of an earlier encounter on 4/21/22: 1.8 m (5' 10.87\").    Weight as of an earlier encounter on 4/21/22: 68 kg (149 lb 14.6 oz).   Last 3 BP:   BP Readings from Last 3 Encounters:   04/21/22 115/67   06/23/21 115/68   01/23/20 111/64     History   Smoking Status     Never Smoker   Smokeless Tobacco     Never " Used       Labs:  Lab Results   Component Value Date    A1C 6.9 04/21/2022     Lab Results   Component Value Date     06/02/2016     Lab Results   Component Value Date    LDL 49 08/11/2021    LDL 68 08/30/2018     HDL Cholesterol   Date Value Ref Range Status   08/30/2018 41 (L) >45 mg/dL Final     Comment:     Low:             <40 mg/dl  Borderline low:   40-45 mg/dl       Direct Measure HDL   Date Value Ref Range Status   08/11/2021 47 >=40 mg/dL Final     Comment:     0-19 years:       Greater than or equal to 45 mg/dL   Low: Less than 40 mg/dL   Borderline low: 40-44 mg/dL     20 years and older:   Female: Greater than or equal to 50 mg/dL   Male:   Greater than or equal to 40 mg/dL        ]  GFR Estimate   Date Value Ref Range Status   06/02/2016 >90  Non  GFR Calc   >60 mL/min/1.7m2 Final     GFR Estimate If Black   Date Value Ref Range Status   06/02/2016 >90   GFR Calc   >60 mL/min/1.7m2 Final     Lab Results   Component Value Date    CR 0.62 06/02/2016     No results found for: MICROALBUMIN    Nutrition Review:  Met with Francisco and his mother today per Dr. Littlejohn for nutrition/carb counting review. Francisco is living at home with his parents and 2 sisters and is doing woodworking with his Dad. He is not currently in school. 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 He has a new Medtronic pump at home but has not yet started it as he states his previous pump always fell off when he sweats and he is unsure about starting it prior to knowing what his second job this summer will be. He states he has tried several different strategies suggested per the RN's but the pump still always fell off when sweating. He has been on injections for the past year, taking 36 units Lantus and approx 1 unit Humalog/8 grams carbohydrate, and correction of 1 unit Humalog/30?150mg/dl. He tells me he is low at least  2x/day and at least once/week his bg will be in the 30's which he treats with regular soda and/or fruit snacks. He states he is not always counting carbs as he knows how much Humalog to take with various meals and snacks.     Diet Recall:   Breakfast:2 toast, villasenor, sometimes egg, sometimes milk  AM snack:none  Lunch:leftovers from dinner or ham sandwich/soup/crackers, regular soda/water/or milk  PM snack:sometimes chips  Dinner:meatloaf/potato/veg or chicken/rice/veg or steak/hamburger, lasagna, spaghetti, vegetables, water/milk/or regular soda  Evening snack:sometimes chips    Eats out in restaurants: about 3 times per month: [employer] or local sit down restaurant    Reviewed current diet and carbohydrate counting with Francisco today. Started to make a list with him of foods commonly consumed along with serving size and total carb for his reference. Encouraged more fruit/calcium-containing foods in his current diet. Suggested phone apps: [email or web address] and OrderWithMe to assist with carb counting as well. Reviewed exercise/activity and low bg-consuming approx 15-30 grams carb/hour of continuous physical activity to prevent low bg. Provided him with a new carb counting book and reviewed that if he restarts the pump, he will need to be more accurate with his carb counting. Suggested he will most likely need a decrease in his insulin doses, he will also see Dr. Littlejohn today.     Education provided today on:  AADE Self-Care Behaviors:  Healthy Eating: carbohydrate counting, heart healthy diet, eating out and label reading  Being Active: relationship to blood glucose and precautions to take    Pt verbalized understanding of concepts discussed and recommendations provided today.       Education Materials Provided:  Carbohydrate Counting    ASSESSMENT: Francisco did not bring his bg meter however it appears he is on too much insulin which will be also addressed by Dr. Littlejohn today. Needed carb counting review today, verbalized correct  carbs with review. Verbalized recommendations to improve diet quality per our discussion today. Needs visit with RN as well to address pump adherence issues when sweating.      PLAN:  1.  Healthy diet review  2. Carb counting review  3. Exercise and carb replacement review  4. Written materials provided  FOLLOW-UP:  Follow up with Dr. Littlejohn annually or as needed        Time Spent: 30 minutes  Encounter Type: Individual    Any diabetes medication dose changes were made via the CDE Protocol and Collaborative Practice Agreement with the patient's endocrinology provider. A copy of this encounter was shared with the provider.    Please do not hesitate to contact me if you have any questions/concerns.     Sincerely,       Debi Cr RD

## 2022-04-21 NOTE — PROGRESS NOTES
"Diabetes Self Management Training: Individual Review Visit    Nicholas M Dubina presents today for education related to Type 1 diabetes.    He is accompanied by mother    Patient Problem List and Family Medical History reviewed for relevant medical history, current medical status, and diabetes risk factors.    Current Diabetes Management per Patient:  Taking diabetes medications?   yes:     Diabetes Medication(s)     Diabetic Other       Glucagon (GVOKE PFS) 1 MG/0.2ML SOSY    Inject 1 mg Subcutaneous as needed (Hypoglycemic seizure or unconsciousness)     glucagon 1 MG injection (Discontinued)    Inject 1 mg into the muscle once For unconscious hypoglycemia only - dispense 2 kits (1 home and 1 school)    Insulin       insulin glargine (LANTUS SOLOSTAR) 100 UNIT/ML pen    Inject 34 units daily     insulin lispro (HUMALOG KWIKPEN) 100 UNIT/ML (1 unit dial) KWIKPEN    Use up to 60 units per day when off of insulin pump     insulin lispro (HUMALOG) 100 UNIT/ML vial    Patient uses up to 100 units per day via insulin pump.          Past Diabetes Education: Yes    Patient glucose self monitoring as follows: no bg meter today.     Vitals:  There were no vitals taken for this visit.  Estimated body mass index is 20.99 kg/m  as calculated from the following:    Height as of an earlier encounter on 4/21/22: 1.8 m (5' 10.87\").    Weight as of an earlier encounter on 4/21/22: 68 kg (149 lb 14.6 oz).   Last 3 BP:   BP Readings from Last 3 Encounters:   04/21/22 115/67   06/23/21 115/68   01/23/20 111/64     History   Smoking Status     Never Smoker   Smokeless Tobacco     Never Used       Labs:  Lab Results   Component Value Date    A1C 6.9 04/21/2022     Lab Results   Component Value Date     06/02/2016     Lab Results   Component Value Date    LDL 49 08/11/2021    LDL 68 08/30/2018     HDL Cholesterol   Date Value Ref Range Status   08/30/2018 41 (L) >45 mg/dL Final     Comment:     Low:             <40 " mg/dl  Borderline low:   40-45 mg/dl       Direct Measure HDL   Date Value Ref Range Status   08/11/2021 47 >=40 mg/dL Final     Comment:     0-19 years:       Greater than or equal to 45 mg/dL   Low: Less than 40 mg/dL   Borderline low: 40-44 mg/dL     20 years and older:   Female: Greater than or equal to 50 mg/dL   Male:   Greater than or equal to 40 mg/dL        ]  GFR Estimate   Date Value Ref Range Status   06/02/2016 >90  Non  GFR Calc   >60 mL/min/1.7m2 Final     GFR Estimate If Black   Date Value Ref Range Status   06/02/2016 >90   GFR Calc   >60 mL/min/1.7m2 Final     Lab Results   Component Value Date    CR 0.62 06/02/2016     No results found for: MICROALBUMIN    Nutrition Review:  Met with Francisco and his mother today per Dr. Littlejohn for nutrition/carb counting review. Francisco is living at home with his parents and 2 sisters and is doing woodworking with his Dad. He is not currently in school. 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 He has a new Medtronic pump at home but has not yet started it as he states his previous pump always fell off when he sweats and he is unsure about starting it prior to knowing what his second job this summer will be. He states he has tried several different strategies suggested per the RN's but the pump still always fell off when sweating. He has been on injections for the past year, taking 36 units Lantus and approx 1 unit Humalog/8 grams carbohydrate, and correction of 1 unit Humalog/30?150mg/dl. He tells me he is low at least 2x/day and at least once/week his bg will be in the 30's which he treats with regular soda and/or fruit snacks. He states he is not always counting carbs as he knows how much Humalog to take with various meals and snacks.     Diet Recall:   Breakfast:2 toast, villasenor, sometimes egg, sometimes milk  AM snack:none  Lunch:leftovers from dinner or  ham sandwich/soup/crackers, regular soda/water/or milk  PM snack:sometimes chips  Dinner:meatloaf/potato/veg or chicken/rice/veg or steak/hamburger, lasagna, spaghetti, vegetables, water/milk/or regular soda  Evening snack:sometimes chips    Eats out in restaurants: about 3 times per month: [employer] or local sit down restaurant    Reviewed current diet and carbohydrate counting with Francisco today. Started to make a list with him of foods commonly consumed along with serving size and total carb for his reference. Encouraged more fruit/calcium-containing foods in his current diet. Suggested phone apps: [employer] and [employer] to assist with carb counting as well. Reviewed exercise/activity and low bg-consuming approx 15-30 grams carb/hour of continuous physical activity to prevent low bg. Provided him with a new carb counting book and reviewed that if he restarts the pump, he will need to be more accurate with his carb counting. Suggested he will most likely need a decrease in his insulin doses, he will also see Dr. Littlejohn today.     Education provided today on:  AADE Self-Care Behaviors:  Healthy Eating: carbohydrate counting, heart healthy diet, eating out and label reading  Being Active: relationship to blood glucose and precautions to take    Pt verbalized understanding of concepts discussed and recommendations provided today.       Education Materials Provided:  Carbohydrate Counting    ASSESSMENT: Francisco did not bring his bg meter however it appears he is on too much insulin which will be also addressed by Dr. Littlejohn today. Needed carb counting review today, verbalized correct carbs with review. Verbalized recommendations to improve diet quality per our discussion today. Needs visit with RN as well to address pump adherence issues when sweating.      PLAN:  1.  Healthy diet review  2. Carb counting review  3. Exercise and carb replacement review  4. Written materials provided  FOLLOW-UP:  Follow up with Dr. Littlejohn  annually or as needed        Time Spent: 30 minutes  Encounter Type: Individual    Any diabetes medication dose changes were made via the CDE Protocol and Collaborative Practice Agreement with the patient's endocrinology provider. A copy of this encounter was shared with the provider.

## 2022-04-21 NOTE — NURSING NOTE
"[facility] [[id number]]  Chief Complaint   Patient presents with     RECHECK     Type 1 Diabetes.     Initial /67   Pulse 76   Ht 5' 10.87\" (180 cm)   Wt 149 lb 14.6 oz (68 kg)   BMI 20.99 kg/m   Estimated body mass index is 20.99 kg/m  as calculated from the following:    Height as of this encounter: 5' 10.87\" (180 cm).    Weight as of this encounter: 149 lb 14.6 oz (68 kg).  Medication Reconciliation: complete     Bhupinder Hill CMA        "

## 2022-04-21 NOTE — PATIENT INSTRUCTIONS
Thank you for choosing [facility].     Maurice Littlejohn MD    It was a pleasure talking to you today! This visit note is available to you in [employer]. If you see any errors or changes/additions you would like me to make to the note please let me know.    We talked about transferring you to adult endocrinologist---I put in a referral for [facility] for an appointment 3 months from now.  In the meantime stay in contact with us, especially as we are doing insulin adjustment.    You are way too low and I have cut back on your insulin.  Please try this new dose and then give us a call---if you are still getting low we will cut back further, or if you are high we may need to split the difference.    You have an expensive, sophisticated pump at home. I know you are worried about it adhering but my suggestion is to give it a try.  I will write out pump doses.    Avoid those built up sites on your arms.    It has been a pleasure working with you---good luck with your future plans!    YOUR INSULIN DOSE IS:  Lantus 30 units (down from 26)  Novolog meal coverage 1 per 15 grams (from 1 per 8)  Novolog correction 1 for every 50 above 150    On the PUMP (Medtronic 770)  Basal rate 1.25 units per hour  Carb ratio 15  Sensitivity 50  Target 110  Active insulin 2 hours    ASick Day Plan:  Pump Failure:  IF YOUR PUMP FAILS AND YOU NEED TO TAKE BASAL INSULIN (GLARGINE, BASAGLAR, TRESIBA, LEVEMIR) THE DOSE IS: 30 units  Remember when you restart your pump that the basal insulin lasts 24 hours so wait until 24 hours is up before starting your pump basal rate.Call on-call endocrinologist or diabetes nurse if this happens. You should also plan to call the pump company right away to troubleshoot the pump failure.    Hyperglycemia (high blood glucose):  Ketones:  Check urine/blood ketones if Oziel is sick, vomiting, or if blood glucose is above 240 twice in a row. Call on-call endocrinologist or diabetes nurse  if ketones are present.    Hypoglycemia (low blood glucose):  If blood glucose is 60 to 80:  1.  Eat or drink 1 carb unit (15 grams carbohydrate).   One carb unit equals:   - 1/2 cup (4 ounces) juice or regular soda pop, or   - 1 cup (8 ounces) milk, or   - 3 to 4 glucose tablets  2.  Re-check your blood glucose in 15 minutes.  3.  Repeat these steps every 15 minutes until your blood glucose is above 100.    If blood glucose is under 60:  1.  Eat or drink 2 carb units (30 grams carbohydrate).  Two carb units equal:   - 1 cup (8 ounces) juice or regular soda pop, or   - 2 cups (16 ounces) milk, or   - 6 to 8 glucose tablets.  2.  Re-check your blood glucose in 15 minutes.  3.  Repeat these steps every 15 minutes until your blood glucose is above 100.    For urgent issues that cannot wait until the next business day, call [phone] and ask for the Pediatric Endocrinologist on call.    You may contact the diabetes nurses with any questions at [phone].  Jenna Stover, SAMARA; Randi Dasilva, RN, BSN; Elvia Navarro, RN; or Nanda Lino RN, BAN may answer, depending on the day. Calls will be returned as soon as possible.      Medication renewal requests must be faxed to the main office by your pharmacy.  Allow 3-4 days for completion.   Main Office: [phone]  Fax: [phone]    Scheduling:    Pediatric Call Center for [facility] and [facility], [phone]  We encourage you to sign up for [employer] for easy communication with us.  Sign up at the clinic  or go to [email or web address].     Please try the [facility] to [facility] ([facility]) phone application for Virtual Tours, Procedure Preparation, Resources, Preparation for Hospital Stay and the Coloring Board.

## 2022-09-27 ENCOUNTER — TELEPHONE (OUTPATIENT)
Dept: ENDOCRINOLOGY | Facility: CLINIC | Age: 22
End: 2022-09-27

## 2022-09-27 DIAGNOSIS — E10.65 TYPE 1 DIABETES MELLITUS WITH HYPERGLYCEMIA (H): ICD-10-CM

## 2022-09-27 NOTE — TELEPHONE ENCOUNTER
[facility] Call Center    Phone Message    May a detailed message be left on voicemail: yes     Reason for Call: Medication Refill Request    Has the patient contacted the pharmacy for the refill? Yes   Name of medication being requested: Pen Needles, BD Ultrafine has used both 4mm and 5mm, either is fine  Provider who prescribed the medication: Dr. Littlejohn  Pharmacy: [facility]  Phone: [phone]  Date medication is needed: Patient has 12 left, uses minimum of 4 per day but sometimes more. Mom states that they now have new insurance in place and are working on setting up an appointment with an adult Endocrinologist, but need another fill to get through.     Action Taken: Message routed to:  Other: Peds Diabetes    Travel Screening: Not Applicable

## 2022-10-11 ENCOUNTER — TELEPHONE (OUTPATIENT)
Dept: ENDOCRINOLOGY | Facility: CLINIC | Age: 22
End: 2022-10-11

## 2022-10-11 DIAGNOSIS — E10.65 TYPE 1 DIABETES MELLITUS WITH HYPERGLYCEMIA (H): ICD-10-CM

## 2022-10-11 RX ORDER — INSULIN GLARGINE 100 [IU]/ML
INJECTION, SOLUTION SUBCUTANEOUS
Qty: 30 ML | Refills: 3 | Status: SHIPPED | OUTPATIENT
Start: 2022-10-11 | End: 2023-07-27

## 2022-10-11 RX ORDER — INSULIN LISPRO 100 [IU]/ML
INJECTION, SOLUTION INTRAVENOUS; SUBCUTANEOUS
Qty: 60 ML | Refills: 3 | Status: SHIPPED | OUTPATIENT
Start: 2022-10-11 | End: 2023-11-21

## 2022-10-11 NOTE — TELEPHONE ENCOUNTER
Health Call Center    Phone Message    May a detailed message be left on voicemail: yes     Reason for Call: Medication Refill Request    Has the patient contacted the pharmacy for the refill? Yes   Name of medication being requested: insulin glargine (LANTUS SOLOSTAR) 100 UNIT/ML pen, insulin lispro (HUMALOG KWIKPEN) 100 UNIT/ML (1 unit dial) KWIKPEN  Provider who prescribed the medication: Dr Littlejohn  Pharmacy:   [facility] [address] Phone:  [phone]   Fax:  [phone]                Mom is in the process of getting the adult appt's det up, waiting on new insurance info.

## 2022-10-11 NOTE — TELEPHONE ENCOUNTER
Refills sent to preferred pharmacy.    Soraya Pierce, RN, BSN  Pediatric Diabetes RN Care Coordinator  [phone]

## 2022-10-12 DIAGNOSIS — E10.65 TYPE 1 DIABETES MELLITUS WITH HYPERGLYCEMIA (H): ICD-10-CM

## 2022-10-12 RX ORDER — INSULIN GLARGINE 100 [IU]/ML
INJECTION, SOLUTION SUBCUTANEOUS
Qty: 30 ML | Refills: 3 | OUTPATIENT
Start: 2022-10-12

## 2022-10-12 RX ORDER — INSULIN LISPRO 100 [IU]/ML
INJECTION, SOLUTION INTRAVENOUS; SUBCUTANEOUS
Qty: 60 ML | Refills: 3 | OUTPATIENT
Start: 2022-10-12

## 2022-10-12 NOTE — TELEPHONE ENCOUNTER
1. Refill request received from: [facility]  2. Medication Requested: Lantus INJ Solostar  3. Directions:INJECT 34 UNITS DAILY  4. Quantity:15  5. Last Office Visit: 4/21/22                    Has it been over a year since the last appointment (6 months for diabetes)? NO                    If No:     Move on to next question.                    If Yes:                      Change refill quantity to 1 month.                      Route to Provider or Pool & let them know its been over a year since patient has been seen.                      If they do not have an upcoming appointment- reach out to family to schedule or route to .  6. Next Appointment Scheduled for: NONE SCHEDULED   7. Last refill: 08/06/2022  8. Sent To: [facility]    1. Refill request received from: [facility]  2. Medication Requested: INSULIN LISP /ML  3. Directions:USE UP TO 60 UNITS PER DAY WHEN OFF OF INSULIN PUMP  4. Quantity:15  5. Last Office Visit: 04/21/22                    Has it been over a year since the last appointment (6 months for diabetes)? NO                    If No:     Move on to next question.                    If Yes:                      Change refill quantity to 1 month.                      Route to Provider or Pool & let them know its been over a year since patient has been seen.                      If they do not have an upcoming appointment- reach out to family to schedule or route to .  6. Next Appointment Scheduled for: NONE SCHEDULED  7. Last refill: 08/05/2022  8. Sent To: DIABETES POOL

## 2022-11-20 ENCOUNTER — HEALTH MAINTENANCE LETTER (OUTPATIENT)
Age: 22
End: 2022-11-20

## 2023-01-19 ENCOUNTER — HOSPITAL ENCOUNTER (EMERGENCY)
Facility: CLINIC | Age: 23
Discharge: HOME | End: 2023-01-19
Attending: PHYSICIAN ASSISTANT | Admitting: PHYSICIAN ASSISTANT
Payer: COMMERCIAL

## 2023-01-19 VITALS
RESPIRATION RATE: 18 BRPM | OXYGEN SATURATION: 99 % | BODY MASS INDEX: 22.4 KG/M2 | WEIGHT: 160 LBS | SYSTOLIC BLOOD PRESSURE: 118 MMHG | HEART RATE: 75 BPM | TEMPERATURE: 98.2 F | DIASTOLIC BLOOD PRESSURE: 63 MMHG

## 2023-01-19 DIAGNOSIS — R05.9 COUGH: ICD-10-CM

## 2023-01-19 DIAGNOSIS — J06.9 URI (UPPER RESPIRATORY INFECTION): ICD-10-CM

## 2023-01-19 DIAGNOSIS — J02.9 ACUTE PHARYNGITIS, UNSPECIFIED ETIOLOGY: ICD-10-CM

## 2023-01-19 DIAGNOSIS — U07.1 INFECTION DUE TO 2019 NOVEL CORONAVIRUS: ICD-10-CM

## 2023-01-19 LAB
DEPRECATED S PYO AG THROAT QL EIA: NEGATIVE
FLUAV RNA SPEC QL NAA+PROBE: NEGATIVE
FLUBV RNA RESP QL NAA+PROBE: NEGATIVE
GROUP A STREP BY PCR: NOT DETECTED
RSV RNA SPEC NAA+PROBE: NEGATIVE
SARS-COV-2 RNA RESP QL NAA+PROBE: POSITIVE

## 2023-01-19 PROCEDURE — 87651 STREP A DNA AMP PROBE: CPT | Performed by: PHYSICIAN ASSISTANT

## 2023-01-19 PROCEDURE — G0463 HOSPITAL OUTPT CLINIC VISIT: HCPCS | Performed by: PHYSICIAN ASSISTANT

## 2023-01-19 PROCEDURE — 99213 OFFICE O/P EST LOW 20 MIN: CPT | Mod: CS | Performed by: PHYSICIAN ASSISTANT

## 2023-01-19 PROCEDURE — 87637 SARSCOV2&INF A&B&RSV AMP PRB: CPT | Performed by: PHYSICIAN ASSISTANT

## 2023-01-19 PROCEDURE — C9803 HOPD COVID-19 SPEC COLLECT: HCPCS | Performed by: PHYSICIAN ASSISTANT

## 2023-01-19 ASSESSMENT — ENCOUNTER SYMPTOMS
SORE THROAT: 1
COUGH: 1
CONSTITUTIONAL NEGATIVE: 1
HEADACHES: 1

## 2023-01-19 NOTE — ED PROVIDER NOTES
History     Chief Complaint   Patient presents with     Cough     Pharyngitis     Headache     HPI  Nicholas M Dubina is a 22 year old male with history of type 1 diabetes mellitus who presents with complaints of sore throat, cough, headache, and congestion for the past 5 to 6 days.  Denies fevers, chills, nausea, vomiting, diarrhea, abdominal pain, chest pain, or shortness of breath.  Patient had a potential ill contact at work.  Denies history of asthma or underlying lung disease.      Allergies:  Allergies   Allergen Reactions     Latex Other (See Comments)     SITE INFECTIONS       Problem List:    Patient Active Problem List    Diagnosis Date Noted     Type 1 diabetes mellitus with hyperglycemia (H) 2016     Priority: Medium     Emesis 12/15/2013     Priority: Medium     Irritability and anger 2009     Priority: Medium     Followed by Dr. Kam.  Initiating 504 plan.       Underweight 2009     Priority: Medium        Past Medical History:    Past Medical History:   Diagnosis Date     Diabetes mellitus, type 1 3/11/2009     Irritability and anger 2009     Transitory tachypnea of       Underweight 2009     Unspecified fetal and  jaundice        Past Surgical History:    Past Surgical History:   Procedure Laterality Date     NO HISTORY OF SURGERY         Family History:    Family History   Problem Relation Age of Onset     Arthritis Mother         rheumatoid     Asthma Father      Allergies Father      Thyroid Disease Maternal Grandmother         hyper     Diabetes Other         type 1     C.A.D. Sister         prolonged QT; dysautonomia     Asthma Sister        Social History:  Marital Status:  Single [1]  Social History     Tobacco Use     Smoking status: Never     Smokeless tobacco: Never   Substance Use Topics     Alcohol use: No     Drug use: No        Medications:    acetaminophen (TYLENOL) 500 MG tablet  betamethasone valerate (VALISONE) 0.1 % ointment  blood  glucose (ACCU-CHEK GUIDE) test strip  blood glucose monitoring (MEG MICROLET) lancets  chlorhexidine (HIBICLENS) 4 % external liquid  Continuous Blood Gluc Sensor (DEXCOM G6 SENSOR) MISC  Continuous Blood Gluc Transmit (DEXCOM G6 TRANSMITTER) MISC  CONTOUR NEXT TEST test strip  Glucagon (GVOKE PFS) 1 MG/0.2ML SOSY  ibuprofen (ADVIL,MOTRIN) 200 MG tablet  insulin glargine (LANTUS SOLOSTAR) 100 UNIT/ML pen  insulin lispro (HUMALOG KWIKPEN) 100 UNIT/ML (1 unit dial) KWIKPEN  insulin lispro (HUMALOG) 100 UNIT/ML vial  insulin pen needle (B-D U/F) 31G X 5 MM miscellaneous  Insulin Pen Needle 31G X 4 MM MISC  INSULIN PUMP ACCESSORIES MISC  ketone blood test (PRECISION XTRA KETONE) STRP  melatonin 3 MG tablet  ondansetron (ZOFRAN) 4 MG tablet  Pediatric Multivit-Minerals-C (FLINTSTONES SOUR GUMMIES PO)          Review of Systems   Constitutional: Negative.    HENT: Positive for congestion and sore throat.    Respiratory: Positive for cough.    Neurological: Positive for headaches.   All other systems reviewed and are negative.      Physical Exam   BP: 118/63  Pulse: 75  Temp: 98.2  F (36.8  C)  Resp: 18  Weight: 72.6 kg (160 lb)  SpO2: 99 %      Physical Exam  Constitutional:       General: He is not in acute distress.     Appearance: Normal appearance. He is well-developed. He is not ill-appearing, toxic-appearing or diaphoretic.   HENT:      Head: Normocephalic and atraumatic.      Right Ear: Tympanic membrane, ear canal and external ear normal.      Left Ear: Tympanic membrane, ear canal and external ear normal.      Nose: Mucosal edema, congestion and rhinorrhea present.      Mouth/Throat:      Lips: Pink.      Mouth: Mucous membranes are moist.      Pharynx: Uvula midline. Posterior oropharyngeal erythema present. No pharyngeal swelling, oropharyngeal exudate or uvula swelling.      Tonsils: No tonsillar exudate or tonsillar abscesses.   Eyes:      Extraocular Movements: Extraocular movements intact.       Conjunctiva/sclera: Conjunctivae normal.      Pupils: Pupils are equal, round, and reactive to light.   Cardiovascular:      Rate and Rhythm: Normal rate and regular rhythm.      Heart sounds: Normal heart sounds.   Pulmonary:      Effort: Pulmonary effort is normal. No respiratory distress.      Breath sounds: Normal breath sounds. No stridor. No wheezing, rhonchi or rales.   Musculoskeletal:         General: Normal range of motion.      Cervical back: Full passive range of motion without pain, normal range of motion and neck supple. No rigidity. Normal range of motion.   Lymphadenopathy:      Cervical: No cervical adenopathy.   Skin:     General: Skin is warm and dry.   Neurological:      Mental Status: He is alert and oriented to person, place, and time.   Psychiatric:         Behavior: Behavior is cooperative.         ED Course                 Procedures      Results for orders placed or performed during the hospital encounter of 01/19/23 (from the past 24 hour(s))   Symptomatic Influenza A/B & SARS-CoV2 (COVID-19) Virus PCR Multiplex Nasopharyngeal    Specimen: Nasopharyngeal; Swab   Result Value Ref Range    Influenza A PCR Negative Negative    Influenza B PCR Negative Negative    RSV PCR Negative Negative    SARS CoV2 PCR Positive (A) Negative    Narrative    Testing was performed using the Xpert Xpress CoV2/Flu/RSV Assay on the [employer] GeneXpert Instrument. This test should be ordered for the detection of SARS-CoV-2 and influenza viruses in individuals who meet clinical and/or epidemiological criteria. Test performance is unknown in asymptomatic patients. This test is for in vitro diagnostic use under the FDA EUA for laboratories certified under CLIA to perform high or moderate complexity testing. This test has not been FDA cleared or approved. A negative result does not rule out the presence of PCR inhibitors in the specimen or target RNA in concentration below the limit of detection for the assay. If only  one viral target is positive but coinfection with multiple targets is suspected, the sample should be re-tested with another FDA cleared, approved, or authorized test, if coinfection would change clinical management. This test was validated by the [facility]. These laboratories are certified under the Clinical Laboratory Improvement Amendments of 1988 (CLIA-88) as qualified to perform high complexity laboratory testing.   Streptococcus A Rapid Scr w Reflx to PCR    Specimen: Throat; Swab   Result Value Ref Range    Group A Strep antigen Negative Negative       Medications - No data to display    Assessments & Plan (with Medical Decision Making)     Pt is a 22 year old male with history of type 1 diabetes mellitus who presents with complaints of sore throat, cough, headache, and congestion for the past 5 to 6 days.      Pt is afebrile on arrival.  Exam as above.  Rapid strep was negative.  Culture is pending.  Influenza testing was negative.  COVID-19 was positive.  Discussed results with patient.  Patient is not hypoxic.  Lungs are clear on exam.  Encouraged symptomatic treatments at home.  Discussed oral antiviral treatment as patient is a type I diabetic however he has been symptomatic for over 5 days, and he also declines treatment with these.  Return precautions were reviewed.  Hand-outs were provided.    Patient was instructed to follow-up with PCP virtually in 3-5 days for continued care and management or sooner if new or worsening symptoms.  He is to return to the ED for persistent and/or worsening symptoms.  Patient expressed understanding of the diagnosis and plan and was discharged home in good condition.    I have reviewed the nursing notes.    I have reviewed the findings, diagnosis, plan and need for follow up with the patient.      Discharge Medication List as of 1/19/2023  4:59 PM          Final diagnoses:   Acute pharyngitis, unspecified etiology   URI (upper respiratory  infection)   Cough   Infection due to 2019 novel coronavirus       1/19/2023   [facility] EMERGENCY DEPT      Disclaimer:  This note consists of symbols derived from keyboarding, dictation and/or voice recognition software.  As a result, there may be errors in the script that have gone undetected.  Please consider this when interpreting information found in this chart.     Mildred Montero PA-C  01/19/23 1958

## 2023-01-20 ENCOUNTER — PATIENT OUTREACH (OUTPATIENT)
Dept: CARE COORDINATION | Facility: CLINIC | Age: 23
End: 2023-01-20
Payer: COMMERCIAL

## 2023-01-31 DIAGNOSIS — E10.65 TYPE 1 DIABETES MELLITUS WITH HYPERGLYCEMIA (H): ICD-10-CM

## 2023-01-31 NOTE — TELEPHONE ENCOUNTER
1. Refill request received from: [facility] in [place]  2. Medication Requested: UNFINE PNTP MIS [id number]  3. Directions:use to test 6 times daily  4. Quantity:200 each  5. Last Office Visit: 4/21/2022                    Has it been over a year since the last appointment (6 months for diabetes)? YES                    If No:     Move on to next question.                    If Yes:                      Change refill quantity to 1 month.                      Route to Provider or Pool & let them know its been over a year since patient has been seen.                      If they do not have an upcoming appointment- reach out to family to schedule or route to .  6. Next Appointment Scheduled for: none scheduled  7. Last refill: 12/23/2022  8. Sent To: DIABETES POOL

## 2023-04-15 ENCOUNTER — HEALTH MAINTENANCE LETTER (OUTPATIENT)
Age: 23
End: 2023-04-15

## 2023-04-25 DIAGNOSIS — E10.65 TYPE 1 DIABETES MELLITUS WITH HYPERGLYCEMIA (H): ICD-10-CM

## 2023-04-25 NOTE — TELEPHONE ENCOUNTER
1. Refill request received from: [facility]  2. Medication Requested: Unfine PNTP Mis [id number]  3. Directions:Use 6 pen needles daily  4. Quantity:1 month  5. Last Office Visit: 04/21/22                    Has it been over a year since the last appointment (6 months for diabetes)? Yes                    If No:     Move on to next question.                    If Yes:                      Change refill quantity to 1 month.                      Route to Provider or Pool & let them know its been over a year since patient has been seen.                      If they do not have an upcoming appointment- reach out to family to schedule or route to .  6. Next Appointment Scheduled for: Not Scheduled  7. Last refill: 01/31/23  8. Sent To: DIABETES POOL

## 2023-06-02 ENCOUNTER — HEALTH MAINTENANCE LETTER (OUTPATIENT)
Age: 23
End: 2023-06-02

## 2023-06-23 ENCOUNTER — TELEPHONE (OUTPATIENT)
Dept: FAMILY MEDICINE | Facility: CLINIC | Age: 23
End: 2023-06-23

## 2023-06-23 DIAGNOSIS — E10.65 TYPE 1 DIABETES MELLITUS WITH HYPERGLYCEMIA (H): ICD-10-CM

## 2023-06-23 NOTE — TELEPHONE ENCOUNTER
Patients mom calling. Francisco is transferring from Peds endocrinology to Adults endocrinology. Patient is unable to be seen until 11/21/23. Was told to contact PCP about covering meds until patient can be seen in November?       Preferred Pharmacy:    [id number] - [facility] ([place], [place]) [address] [address]  [address] [address]  Phone: [phone] Fax: [phone]        Could we send this information to you in [employer] or would you prefer to receive a phone call?:   Patient would prefer a phone call   Okay to leave a detailed message?: Yes at Home number on file [phone] (home)

## 2023-06-27 NOTE — TELEPHONE ENCOUNTER
Dominique notified and understood. She ask pt what he would do. Pt does not have a Primary at [place]. They will see if some one will at his other Endocrinologist office fill the medication in the [facility].  [facility]

## 2023-07-27 ENCOUNTER — TELEPHONE (OUTPATIENT)
Dept: ENDOCRINOLOGY | Facility: CLINIC | Age: 23
End: 2023-07-27
Payer: COMMERCIAL

## 2023-07-27 DIAGNOSIS — E10.65 TYPE 1 DIABETES MELLITUS WITH HYPERGLYCEMIA (H): ICD-10-CM

## 2023-07-27 RX ORDER — INSULIN GLARGINE 100 [IU]/ML
INJECTION, SOLUTION SUBCUTANEOUS
Qty: 30 ML | Refills: 2 | OUTPATIENT
Start: 2023-07-27

## 2023-07-27 RX ORDER — INSULIN GLARGINE 100 [IU]/ML
INJECTION, SOLUTION SUBCUTANEOUS
Qty: 30 ML | Refills: 3 | Status: SHIPPED | OUTPATIENT
Start: 2023-07-27 | End: 2023-11-21

## 2023-07-27 NOTE — TELEPHONE ENCOUNTER
Olesya- please see request for Lantus and pen needles; also see today phone enc with endo. Has appt scheduled with endo 11-21=23.  In meantime needs someone to refill insulin but does not have PCP.  Please advise.  EDWARD Ponce RN

## 2023-07-27 NOTE — TELEPHONE ENCOUNTER
Spoke with mom regarding lantus prescription. Advised her we are unable to fill as patient has not been seen, but that primary care had sent in a prescription in June. Mother verbalized understanding and was agreeable to plan.     Mary Carmen Munoz RN, MSN-Ed, [facility]  Pediatric Diabetes Nurse Educator  07/27/23 10:13 AM

## 2023-07-27 NOTE — TELEPHONE ENCOUNTER
Health Call Center    Phone Message    May a detailed message be left on voicemail: yes     Reason for Call: Other: Mom is calling to see if a care team member can call her to discuss Dr Littlejohn continuing to prescribe the Lantus for the patient until they are able to establish new care on 11/21/2023 with an adult provider.   Mom would like a call back to discuss as they are out of medication.           Action Taken: Other: peds Endo/ Diabetes    Travel Screening: Not Applicable

## 2023-07-27 NOTE — TELEPHONE ENCOUNTER
Pharmacy calling. Patients mom states an RX for Patients Lantus should have been sent on 6/23/23. Encounter from Patients endocrinolgist on 7/27/23 states PCP sent RX over on 6/23/23. Pharmacy has not received. Please resend.      Preferred Pharmacy:    [id number] - [place], [place] - [address] [address]  [address] [place] [address]  Phone: [phone] Fax: [phone]        Could we send this information to you in [employer] or would you prefer to receive a phone call?:   Patient would prefer a phone call   Okay to leave a detailed message?: Yes at Home number on file [phone] (home)

## 2023-11-21 ENCOUNTER — VIRTUAL VISIT (OUTPATIENT)
Dept: ENDOCRINOLOGY | Facility: CLINIC | Age: 23
End: 2023-11-21
Payer: COMMERCIAL

## 2023-11-21 DIAGNOSIS — E10.65 TYPE 1 DIABETES MELLITUS WITH HYPERGLYCEMIA (H): ICD-10-CM

## 2023-11-21 PROCEDURE — 99215 OFFICE O/P EST HI 40 MIN: CPT | Mod: VID | Performed by: INTERNAL MEDICINE

## 2023-11-21 RX ORDER — BLOOD SUGAR DIAGNOSTIC
STRIP MISCELLANEOUS
Qty: 200 STRIP | Refills: 11 | Status: SHIPPED | OUTPATIENT
Start: 2023-11-21

## 2023-11-21 RX ORDER — INSULIN LISPRO 100 [IU]/ML
INJECTION, SOLUTION INTRAVENOUS; SUBCUTANEOUS
Qty: 60 ML | Refills: 4 | Status: SHIPPED | OUTPATIENT
Start: 2023-11-21

## 2023-11-21 RX ORDER — INSULIN GLARGINE 100 [IU]/ML
INJECTION, SOLUTION SUBCUTANEOUS
Qty: 45 ML | Refills: 4 | Status: SHIPPED | OUTPATIENT
Start: 2023-11-21

## 2023-11-21 RX ORDER — ACYCLOVIR 400 MG/1
1 TABLET ORAL
Qty: 3 EACH | Refills: 5 | Status: SHIPPED | OUTPATIENT
Start: 2023-11-21 | End: 2024-06-05

## 2023-11-21 RX ORDER — INSULIN GLARGINE-YFGN 100 [IU]/ML
INJECTION, SOLUTION SUBCUTANEOUS
COMMUNITY
Start: 2023-10-30 | End: 2024-01-19

## 2023-11-21 NOTE — Clinical Note
Please schedule for followup with any endo provider who sees diabetes in 3 months or soonest available after that.

## 2023-11-21 NOTE — LETTER
11/21/2023         RE: Nicholas M Dubina  [address] [place] [address]        Dear Colleague,    Thank you for referring your patient, Nicholas M Dubina, to the [facility] ENDOCRINOLOGY. Please see a copy of my visit note below.    Francisco is a 23 year old who is being evaluated via a billable video visit.      How would you like to obtain your AVS? MyChart and mail  If the video visit is dropped, the invitation should be resent by: [email or web address]  Will anyone else be joining your video visit? No        Endocrine Consult Video visit note    Attending Assessment/Plan :     Type 1 diabetes mellitus with hyperglycemia (H)    Assessment:  -at goal a1c of <7% but needs updated a1c    Plan:  -continue glargine at current dose but start self adjustment plan of changing dose by 1 unit ever 3 days to maintain -130 without fasting hypoglycemia  -continue lispro at current dose  -recommend that he have a carb containing snack in the morning without insulin coverage if he continues to have hypoglycemia at work when skipping full breakfast (after making sure basal dose isn't too high first)  -ophtho referral for diabetic eye exam  -sent in script for dexcom g7  -renewed insulin and test strips  -check a1c and TSH  -will get pt scheduled for endocrine followup in 3 months or soonest available after that     I have independently reviewed and interpreted labs, imaging as indicated.     RTC 3 months with labs 1-2 weeks before followup    Chief complaint:  Oziel is a 23 year old male seen for type 1 diabetes    HISTORY OF PRESENT ILLNESS      Diabetes diagnosed : Age 9.  Had been on a pump in the past but he would sweat easily causing issues with insertion site.  Additionally he had latex allergy so had some trouble with certain insertion products.      Complications - none       Current regimen:  Glargine - 35 units - takes daily at about 6pm   Lispro - CR 1:8;  CF 1:50 > 150    Eye exam:  due    Foot exam: virtual visit so could not complete today     Comprehensive diabetes education: completed    SMBG:  3-4 times per day  Morning fasting - Low 100s or just slightly below   Daytime - occasional lows  Post dinner - 150-160s, occasionally into low 200s     Having lows at least weekly.  Most commonly with activity.  Occasional lows overnight. Does get symptoms when going low.  Has a fairly active job at [employer].  Notices that he will have hypoglycemia in the morning during activity at work especially if he has skipped breakfast.  Not having frequent hypos post prandial.     Endocrine relevant labs and imaging are as follows:  Component      Latest Ref Rng 2022  12:00 AM   Hemoglobin A1C      0.0 - 5.7 % 6.9 !       Legend:  ! Abnormal    REVIEW OF SYSTEMS    10 system ROS otherwise as per the HPI or negative    Past Medical History  Past Medical History:   Diagnosis Date     Diabetes mellitus, type 1 3/11/2009     Irritability and anger 2009    Followed by Dr. Kam.  Initiating 504 plan.     Transitory tachypnea of      Level 2 nursery x 30 hours     Underweight 2009     Unspecified fetal and  jaundice     Peak bilirubin = 18.0. Received phototherapy       Medications  Current Outpatient Medications   Medication Sig Dispense Refill     acetaminophen (TYLENOL) 500 MG tablet Take 1 tablet (500 mg) by mouth every 6 hours as needed 20 tablet 0     blood glucose (ACCU-CHEK GUIDE) test strip Use Accu-chek guide test strip to test blood sugar 6 times daily or as directed. 200 strip 11     Continuous Blood Gluc Sensor (DEXCOM G7 SENSOR) MISC 1 Device every 10 days 3 each 5     Glucagon (GVOKE HYPOPEN) 1 MG/0.2ML pen Inject the contents of 1 device under the skin into lower abdomen, outer thigh, or outer upper arm as needed for hypoglycemia. If no response after 15 minutes, additional 1 mg dose from a new device may be injected while waiting for emergency assistance. 0.4 mL 11      Glucagon (GVOKE PFS) 1 MG/0.2ML SOSY Inject 1 mg Subcutaneous as needed (Hypoglycemic seizure or unconsciousness) 2 Syringe 3     ibuprofen (ADVIL,MOTRIN) 200 MG tablet Take 400 mg by mouth every 4 hours as needed        insulin glargine (LANTUS SOLOSTAR) 100 UNIT/ML pen Inject 30 units daily 45 mL 4     Insulin Glargine-yfgn 100 UNIT/ML SOPN        insulin lispro (HUMALOG KWIKPEN) 100 UNIT/ML (1 unit dial) KWIKPEN Inject 1 unit per 8g carbs plus correction as instructed.  Max TDD 60 60 mL 4     insulin lispro (HUMALOG) 100 UNIT/ML vial Patient uses up to 100 units per day via insulin pump. 90 mL 3     Insulin Pen Needle 31G X 4 MM MISC 6 each daily 400 each 4     betamethasone valerate (VALISONE) 0.1 % ointment Apply topically 2 times daily (Patient not taking: Reported on 11/21/2023) 45 g 0     blood glucose monitoring (OokbeeET) lancets Use to test blood sugar 6 times daily or as directed. 600 each 3     chlorhexidine (HIBICLENS) 4 % external liquid Use to clean off skin prior to pump site insertions. (Patient not taking: Reported on 11/21/2023) 120 mL 6     Continuous Blood Gluc Sensor (DEXCOM G6 SENSOR) MISC 1 each every 10 days (Patient not taking: Reported on 11/21/2023) 3 each 11     Continuous Blood Gluc Transmit (DEXCOM G6 TRANSMITTER) MISC 1 each every 3 months (Patient not taking: Reported on 11/21/2023) 1 each 3     CONTOUR NEXT TEST test strip Use to test blood sugar 6 times daily or as directed. (Patient not taking: Reported on 11/21/2023) 550 each 3     insulin pen needle (B-D U/F) 31G X 5 MM miscellaneous Use 6 time(s) a day. 200 each 3     INSULIN PUMP ACCESSORIES MISC None Entered       ketone blood test (PRECISION XTRA KETONE) STRP Check blood ketones when two consecutive blood sugars are greater than 300 and/or at times of illness/vomiting. 60 each 3     melatonin 3 MG tablet Take 3 mg by mouth nightly as needed. (Patient not taking: Reported on 11/21/2023)       ondansetron (ZOFRAN) 4 MG  tablet Take 1 tablet (4 mg) by mouth every 8 hours as needed for nausea (Patient not taking: Reported on 11/21/2023) 6 tablet 0     Pediatric Multivit-Minerals-C (FLINTSTONES SOUR GUMMIES PO) Take 1 chew tab by mouth daily (Patient not taking: Reported on 11/21/2023)         Allergies  Allergies   Allergen Reactions     Latex Other (See Comments)     SITE INFECTIONS       Family History  family history includes Allergies in his father; Arthritis in his mother; Asthma in his father and sister; C.A.D. in his sister; Diabetes in an other family member; Thyroid Disease in his maternal grandmother.    Social History  Social History     Tobacco Use     Smoking status: Never     Smokeless tobacco: Never   Substance Use Topics     Alcohol use: No     Drug use: No       Physical Exam  There is no height or weight on file to calculate BMI.  GENERAL: no distress  SKIN: Visible skin clear. No significant rash, abnormal pigmentation or lesions.  EYES: Eyes grossly normal to inspection.  No discharge or erythema, or obvious scleral/conjunctival abnormalities.  NECK: visible goiter is not present; no visible neck masses  RESP: No audible wheeze, cough, or visible cyanosis.  No visible retractions or increased work of breathing.    NEURO: Awake, alert, responds appropriately to questions.  Mentation and speech fluent.  PSYCH:affect normal and appearance well-groomed.    Video-Visit Details    Type of service:  Video Visit    Video Start Time (time video started): 1330    Video End Time (time video stopped): 1415    Originating Location (pt. Location): Home      Distant Location (provider location):  Off-site    Mode of Communication:  Video Conference via [facility]    Physician has received verbal consent for a Video Visit from the patient? Yes    I spent a total of 46 minutes on the day of this new patient visit on chart review, lab review, coordinating care, exam, and counseling of patient    Aris Celis MD           Again,  thank you for allowing me to participate in the care of your patient.        Sincerely,        Aris Celis MD

## 2023-11-21 NOTE — PATIENT INSTRUCTIONS
How to self-adjust long-acting insulin    Check morning fasting blood sugar first thing in the morning daily  Adjust glargine dose every 3 days based on morning blood sugar:  If average is over 130, increase insulin dose by 1 unit going forward  If average is 100-130 continue current dose  If average is less than 100 with no lows, decrease dose by 1 unit  If you have ANY blood sugars below 70, decrease dose by 1 unit immediately (do not wait a few days to change in this case)    -------------------------------------------------------------------------------------

## 2023-11-21 NOTE — PROGRESS NOTES
Francisco is a 23 year old who is being evaluated via a billable video visit.      How would you like to obtain your AVS? MyChart and mail  If the video visit is dropped, the invitation should be resent by: [email or web address]  Will anyone else be joining your video visit? No        Endocrine Consult Video visit note    Attending Assessment/Plan :     Type 1 diabetes mellitus with hyperglycemia (H)    Assessment:  -at goal a1c of <7% but needs updated a1c    Plan:  -continue glargine at current dose but start self adjustment plan of changing dose by 1 unit ever 3 days to maintain -130 without fasting hypoglycemia  -continue lispro at current dose  -recommend that he have a carb containing snack in the morning without insulin coverage if he continues to have hypoglycemia at work when skipping full breakfast (after making sure basal dose isn't too high first)  -ophtho referral for diabetic eye exam  -sent in script for dexcom g7  -renewed insulin and test strips  -check a1c and TSH  -will get pt scheduled for endocrine followup in 3 months or soonest available after that     I have independently reviewed and interpreted labs, imaging as indicated.     RTC 3 months with labs 1-2 weeks before followup    Chief complaint:  Oziel is a 23 year old male seen for type 1 diabetes    HISTORY OF PRESENT ILLNESS      Diabetes diagnosed : Age 9.  Had been on a pump in the past but he would sweat easily causing issues with insertion site.  Additionally he had latex allergy so had some trouble with certain insertion products.      Complications - none       Current regimen:  Glargine - 35 units - takes daily at about 6pm   Lispro - CR 1:8;  CF 1:50 > 150    Eye exam:  due   Foot exam: virtual visit so could not complete today     Comprehensive diabetes education: completed    SMBG:  3-4 times per day  Morning fasting - Low 100s or just slightly below   Daytime - occasional lows  Post dinner - 150-160s, occasionally into low  200s     Having lows at least weekly.  Most commonly with activity.  Occasional lows overnight. Does get symptoms when going low.  Has a fairly active job at [employer].  Notices that he will have hypoglycemia in the morning during activity at work especially if he has skipped breakfast.  Not having frequent hypos post prandial.     Endocrine relevant labs and imaging are as follows:  Component      Latest Ref Rng 2022  12:00 AM   Hemoglobin A1C      0.0 - 5.7 % 6.9 !       Legend:  ! Abnormal    REVIEW OF SYSTEMS    10 system ROS otherwise as per the HPI or negative    Past Medical History  Past Medical History:   Diagnosis Date    Diabetes mellitus, type 1 3/11/2009    Irritability and anger 2009    Followed by Dr. Kam.  Initiating 504 plan.    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 Received phototherapy       Medications  Current Outpatient Medications   Medication Sig Dispense Refill    acetaminophen (TYLENOL) 500 MG tablet Take 1 tablet (500 mg) by mouth every 6 hours as needed 20 tablet 0    blood glucose (ACCU-CHEK GUIDE) test strip Use Accu-chek guide test strip to test blood sugar 6 times daily or as directed. 200 strip 11    Continuous Blood Gluc Sensor (DEXCOM G7 SENSOR) MISC 1 Device every 10 days 3 each 5    Glucagon (GVOKE HYPOPEN) 1 MG/0.2ML pen Inject the contents of 1 device under the skin into lower abdomen, outer thigh, or outer upper arm as needed for hypoglycemia. If no response after 15 minutes, additional 1 mg dose from a new device may be injected while waiting for emergency assistance. 0.4 mL 11    Glucagon (GVOKE PFS) 1 MG/0.2ML SOSY Inject 1 mg Subcutaneous as needed (Hypoglycemic seizure or unconsciousness) 2 Syringe 3    ibuprofen (ADVIL,MOTRIN) 200 MG tablet Take 400 mg by mouth every 4 hours as needed       insulin glargine (LANTUS SOLOSTAR) 100 UNIT/ML  pen Inject 30 units daily 45 mL 4    Insulin Glargine-yfgn 100 UNIT/ML SOPN       insulin lispro (HUMALOG KWIKPEN) 100 UNIT/ML (1 unit dial) KWIKPEN Inject 1 unit per 8g carbs plus correction as instructed.  Max TDD 60 60 mL 4    insulin lispro (HUMALOG) 100 UNIT/ML vial Patient uses up to 100 units per day via insulin pump. 90 mL 3    Insulin Pen Needle 31G X 4 MM MISC 6 each daily 400 each 4    betamethasone valerate (VALISONE) 0.1 % ointment Apply topically 2 times daily (Patient not taking: Reported on 11/21/2023) 45 g 0    blood glucose monitoring (A.P.PharmaET) lancets Use to test blood sugar 6 times daily or as directed. 600 each 3    chlorhexidine (HIBICLENS) 4 % external liquid Use to clean off skin prior to pump site insertions. (Patient not taking: Reported on 11/21/2023) 120 mL 6    Continuous Blood Gluc Sensor (DEXCOM G6 SENSOR) MISC 1 each every 10 days (Patient not taking: Reported on 11/21/2023) 3 each 11    Continuous Blood Gluc Transmit (DEXCOM G6 TRANSMITTER) MISC 1 each every 3 months (Patient not taking: Reported on 11/21/2023) 1 each 3    CONTOUR NEXT TEST test strip Use to test blood sugar 6 times daily or as directed. (Patient not taking: Reported on 11/21/2023) 550 each 3    insulin pen needle (B-D U/F) 31G X 5 MM miscellaneous Use 6 time(s) a day. 200 each 3    INSULIN PUMP ACCESSORIES MISC None Entered      ketone blood test (PRECISION XTRA KETONE) STRP Check blood ketones when two consecutive blood sugars are greater than 300 and/or at times of illness/vomiting. 60 each 3    melatonin 3 MG tablet Take 3 mg by mouth nightly as needed. (Patient not taking: Reported on 11/21/2023)      ondansetron (ZOFRAN) 4 MG tablet Take 1 tablet (4 mg) by mouth every 8 hours as needed for nausea (Patient not taking: Reported on 11/21/2023) 6 tablet 0    Pediatric Multivit-Minerals-C (FLINTSTONES SOUR GUMMIES PO) Take 1 chew tab by mouth daily (Patient not taking: Reported on 11/21/2023)          Allergies  Allergies   Allergen Reactions    Latex Other (See Comments)     SITE INFECTIONS       Family History  family history includes Allergies in his father; Arthritis in his mother; Asthma in his father and sister; C.A.D. in his sister; Diabetes in an other family member; Thyroid Disease in his maternal grandmother.    Social History  Social History     Tobacco Use    Smoking status: Never    Smokeless tobacco: Never   Substance Use Topics    Alcohol use: No    Drug use: No       Physical Exam  There is no height or weight on file to calculate BMI.  GENERAL: no distress  SKIN: Visible skin clear. No significant rash, abnormal pigmentation or lesions.  EYES: Eyes grossly normal to inspection.  No discharge or erythema, or obvious scleral/conjunctival abnormalities.  NECK: visible goiter is not present; no visible neck masses  RESP: No audible wheeze, cough, or visible cyanosis.  No visible retractions or increased work of breathing.    NEURO: Awake, alert, responds appropriately to questions.  Mentation and speech fluent.  PSYCH:affect normal and appearance well-groomed.    Video-Visit Details    Type of service:  Video Visit    Video Start Time (time video started): 1330    Video End Time (time video stopped): 1415    Originating Location (pt. Location): Home      Distant Location (provider location):  Off-site    Mode of Communication:  Video Conference via [employer]    Physician has received verbal consent for a Video Visit from the patient? Yes    I spent a total of 46 minutes on the day of this new patient visit on chart review, lab review, coordinating care, exam, and counseling of patient    Aris Celis MD

## 2023-11-27 ENCOUNTER — TELEPHONE (OUTPATIENT)
Dept: ENDOCRINOLOGY | Facility: CLINIC | Age: 23
End: 2023-11-27
Payer: COMMERCIAL

## 2023-11-27 NOTE — TELEPHONE ENCOUNTER
Spoke with mother Dominique, scheduled in [facility] and labs prior Rachel Huntley on 11/27/2023 at 4:44 PM

## 2024-01-19 ENCOUNTER — OFFICE VISIT (OUTPATIENT)
Dept: FAMILY MEDICINE | Facility: CLINIC | Age: 24
End: 2024-01-19
Payer: COMMERCIAL

## 2024-01-19 VITALS
HEIGHT: 72 IN | OXYGEN SATURATION: 98 % | RESPIRATION RATE: 20 BRPM | DIASTOLIC BLOOD PRESSURE: 68 MMHG | WEIGHT: 158 LBS | BODY MASS INDEX: 21.4 KG/M2 | TEMPERATURE: 97.5 F | HEART RATE: 74 BPM | SYSTOLIC BLOOD PRESSURE: 124 MMHG

## 2024-01-19 DIAGNOSIS — E10.65 TYPE 1 DIABETES MELLITUS WITH HYPERGLYCEMIA (H): ICD-10-CM

## 2024-01-19 DIAGNOSIS — H61.20 IMPACTED CERUMEN, UNSPECIFIED LATERALITY: Primary | ICD-10-CM

## 2024-01-19 PROCEDURE — 69209 REMOVE IMPACTED EAR WAX UNI: CPT | Mod: 50 | Performed by: FAMILY MEDICINE

## 2024-01-19 ASSESSMENT — PAIN SCALES - GENERAL: PAINLEVEL: MILD PAIN (2)

## 2024-01-19 NOTE — PROGRESS NOTES
Assessment & Plan   Problem List Items Addressed This Visit       Type 1 diabetes mellitus with hyperglycemia (H)     Other Visit Diagnoses       Impacted cerumen, unspecified laterality    -  Primary             Patient had ears irrigated, clear and no sign of infection. Able to get wax out.     Follows with endocrinology for his diabetes.       FUTURE APPOINTMENTS:       - Follow-up visit as needed.      Nupur Singh is a 23 year old, presenting for the following health issues:  Ear Problem  Patient here for ear ache. Started a few days ago. He had some minor cold symptoms earlier on in the week. Ears feel plugged.      1/19/2024     9:30 AM   Additional Questions   Roomed by Lorna CHUN   Accompanied by self         1/19/2024     9:30 AM   Patient Reported Additional Medications   Patient reports taking the following new medications no new meds     History of Present Illness       Reason for visit:  Ear is clogged    He eats 0-1 servings of fruits and vegetables daily.He consumes 3 sweetened beverage(s) daily.He exercises with enough effort to increase his heart rate 60 or more minutes per day.  He exercises with enough effort to increase his heart rate 5 days per week.   He is taking medications regularly.         Acute Illness  Acute illness concerns: ear pain   Onset/Duration: about a week, ear pain the last 4 days   Symptoms:  Fever: No  Chills/Sweats: No  Headache (location?): YES- yesterday   Sinus Pressure: No  Conjunctivitis:  No  Ear Pain: YES: left, not painful just feels clogged   Rhinorrhea: No  Congestion: YES  Sore Throat: No  Cough: YES-non-productive, better   Wheeze: No  Decreased Appetite: No  Nausea: No  Vomiting: No  Diarrhea: No  Dysuria/Freq.: No  Dysuria or Hematuria: No  Fatigue/Achiness: No  Sick/Strep Exposure: YES- sister is sick   Therapies tried and outcome: hydrogen peroxide and rinsed with water         Review of Systems  CONSTITUTIONAL: NEGATIVE for fever, chills, change in  "weight  ENT/MOUTH: POSITIVE for ear pain bilateral and earache bilateral  RESP: NEGATIVE for significant cough or SOB  CV: NEGATIVE for chest pain, palpitations or peripheral edema  GI: NEGATIVE for nausea, abdominal pain, heartburn, or change in bowel habits  MUSCULOSKELETAL: NEGATIVE for significant arthralgias or myalgia  ENDOCRINE: NEGATIVE for temperature intolerance, skin/hair changes  PSYCHIATRIC: NEGATIVE for changes in mood or affect      Objective    /68   Pulse 74   Temp 97.5  F (36.4  C) (Tympanic)   Resp 20   Ht 1.816 m (5' 11.5\")   Wt 71.7 kg (158 lb)   SpO2 98%   BMI 21.73 kg/m    Body mass index is 21.73 kg/m .  Physical Exam   GENERAL: alert and no distress  HENT: normal cephalic/atraumatic, both ears: occluded with wax, nose and mouth without ulcers or lesions, oropharynx clear, and oral mucous membranes moist  MS: no gross musculoskeletal defects noted, no edema  SKIN: no suspicious lesions or rashes  PSYCH: mentation appears normal, affect normal/bright            Signed Electronically by: Paco Clay MD    "

## 2024-02-03 ENCOUNTER — HEALTH MAINTENANCE LETTER (OUTPATIENT)
Age: 24
End: 2024-02-03

## 2024-06-01 ENCOUNTER — TELEPHONE (OUTPATIENT)
Dept: ENDOCRINOLOGY | Facility: CLINIC | Age: 24
End: 2024-06-01
Payer: COMMERCIAL

## 2024-06-01 DIAGNOSIS — E10.65 TYPE 1 DIABETES MELLITUS WITH HYPERGLYCEMIA (H): ICD-10-CM

## 2024-06-05 RX ORDER — ACYCLOVIR 400 MG/1
TABLET ORAL
Qty: 9 EACH | Refills: 0 | Status: SHIPPED | OUTPATIENT
Start: 2024-06-05 | End: 2024-09-06

## 2024-06-05 NOTE — TELEPHONE ENCOUNTER
Requested Prescriptions   Pending Prescriptions Disp Refills    Continuous Glucose Sensor (DEXCOM G7 SENSOR) MISC [Pharmacy Med Name: DEXCOM G7 SENSOR MISCELLANEOUS]  4     Sig: USE 1 DEVICE EVERY 10 DAYS       There is no refill protocol information for this order

## 2024-06-22 ENCOUNTER — HEALTH MAINTENANCE LETTER (OUTPATIENT)
Age: 24
End: 2024-06-22

## 2024-08-31 ENCOUNTER — HEALTH MAINTENANCE LETTER (OUTPATIENT)
Age: 24
End: 2024-08-31

## 2024-09-05 DIAGNOSIS — E10.65 TYPE 1 DIABETES MELLITUS WITH HYPERGLYCEMIA (H): ICD-10-CM

## 2024-09-06 RX ORDER — ACYCLOVIR 400 MG/1
TABLET ORAL
Qty: 3 EACH | Refills: 0 | Status: SHIPPED | OUTPATIENT
Start: 2024-09-06 | End: 2024-10-07

## 2024-09-06 NOTE — TELEPHONE ENCOUNTER
Requested Prescriptions   Pending Prescriptions Disp Refills    Continuous Glucose Sensor (DEXCOM G7 SENSOR) MISC [Pharmacy Med Name: DEXCOM G7 SENSOR MISCELLANEOUS] 3 each 0     Sig: USE 1 DEVICE EVERY 10 DAYS       There is no refill protocol information for this order

## 2024-10-04 DIAGNOSIS — E10.65 TYPE 1 DIABETES MELLITUS WITH HYPERGLYCEMIA (H): ICD-10-CM

## 2024-10-04 RX ORDER — ACYCLOVIR 400 MG/1
TABLET ORAL
Refills: 0 | OUTPATIENT
Start: 2024-10-04

## 2024-10-07 RX ORDER — ACYCLOVIR 400 MG/1
TABLET ORAL
Qty: 9 EACH | Refills: 0 | Status: SHIPPED | OUTPATIENT
Start: 2024-10-07

## 2024-10-07 NOTE — TELEPHONE ENCOUNTER
Requested Prescriptions   Signed Prescriptions Disp Refills    Continuous Glucose Sensor (DEXCOM G7 SENSOR) MISC 9 each 0     Sig: Change every 10 days.       There is no refill protocol information for this order      Refused Prescriptions Disp Refills    Continuous Glucose Sensor (DEXCOM G7 SENSOR) MISC [Pharmacy Med Name: DEXCOM G7 SENSOR MISCELLANEOUS]  0     Sig: USE 1 DEVICE EVERY 10 DAYS       There is no refill protocol information for this order

## 2024-10-07 NOTE — TELEPHONE ENCOUNTER
The pharmacy closes at 6pm and they need this Rx before 5pm to ensure they can get the prescription on time.

## 2024-12-13 DIAGNOSIS — E10.65 TYPE 1 DIABETES MELLITUS WITH HYPERGLYCEMIA (H): Primary | ICD-10-CM

## 2024-12-18 ENCOUNTER — TELEPHONE (OUTPATIENT)
Dept: ENDOCRINOLOGY | Facility: CLINIC | Age: 24
End: 2024-12-18
Payer: COMMERCIAL

## 2024-12-18 DIAGNOSIS — E10.65 TYPE 1 DIABETES MELLITUS WITH HYPERGLYCEMIA (H): ICD-10-CM

## 2024-12-18 RX ORDER — PEN NEEDLE, DIABETIC 32GX 5/32"
NEEDLE, DISPOSABLE MISCELLANEOUS
Qty: 400 EACH | Refills: 0 | Status: SHIPPED | OUTPATIENT
Start: 2024-12-18

## 2024-12-18 RX ORDER — ACYCLOVIR 400 MG/1
TABLET ORAL
Qty: 9 EACH | Refills: 0 | Status: SHIPPED | OUTPATIENT
Start: 2024-12-18

## 2024-12-18 RX ORDER — INSULIN GLARGINE-YFGN 100 [IU]/ML
35 INJECTION, SOLUTION SUBCUTANEOUS DAILY
Qty: 30 ML | Refills: 0 | Status: SHIPPED | OUTPATIENT
Start: 2024-12-18

## 2024-12-18 NOTE — TELEPHONE ENCOUNTER
[facility] Call Center    Phone Message    May a detailed message be left on voicemail: yes     Reason for Call: Medication Refill Request    Has the patient contacted the pharmacy for the refill? Yes   Name of medication being requested: Dexcom   Provider who prescribed the medication: Michael  Pharmacy:   [facility], [place] - [address]     Date medication is needed: ASAP  Patient's mother called stating she noticed the Dexcom will be out as well and needs a refill on that. Can you send that over also.

## 2024-12-18 NOTE — TELEPHONE ENCOUNTER
Rx's can only be bridged until appt on 3/17. If pt is not seen pt will need to have pcp manage.

## 2025-03-02 ENCOUNTER — HEALTH MAINTENANCE LETTER (OUTPATIENT)
Age: 25
End: 2025-03-02

## 2025-03-11 DIAGNOSIS — E10.65 TYPE 1 DIABETES MELLITUS WITH HYPERGLYCEMIA (H): ICD-10-CM

## 2025-03-12 RX ORDER — INSULIN GLARGINE-YFGN 100 [IU]/ML
35 INJECTION, SOLUTION SUBCUTANEOUS DAILY
Qty: 15 ML | Refills: 0 | Status: SHIPPED | OUTPATIENT
Start: 2025-03-12

## 2025-03-12 NOTE — TELEPHONE ENCOUNTER
Requested Prescriptions   Pending Prescriptions Disp Refills    Insulin Glargine-yfgn 100 UNIT/ML SOPN [Pharmacy Med Name: INSULIN GLARGINE-YFGN 100U/ML SOLN PEN-INJ] 15 mL 0     Sig: INJECT 35 UNITS SUBCUTANEOUSLY DAILY.       Insulin Protocol Failed - 3/12/2025 10:13 AM        Failed - HgbA1C in past 3 or 6 months     If HgbA1C is 8 or greater, it needs to be on file within the past 3 months.  If less than 8, must be on file within the past 6 months.     Recent Labs   Lab Test 04/21/22  0000   A1C 6.9*             Failed - Recent (6 mo) or future (90 days) visit within the authorizing provider's specialty     The patient must have completed an in-person or virtual visit within the past 6 months or has a future visit scheduled within the next 90 days with the authorizing provider s specialty.  Urgent care and e-visits do not quality as an office visit for this protocol.          Failed - Chart review required     Review Chart.    Do not approve if insulin is used in a pump.  Instead, direct refill request to the patient's endocrinologist.  If the patient doesn't have an endocrinologist, then send the refill to the patient's PCP for review            Passed - Medication is active on med list and the sig matches. RN to manually verify dose and sig if red X/fail.     If the protocol passes (green check), you do not need to verify med dose and sig.    A prescription matches if they are the same clinical intention.    For Example: once daily and every morning are the same.    The protocol can not identify upper and lower case letters as matching and will fail.     For Example: Take 1 tablet (50 mg) by mouth daily     TAKE 1 TABLET (50 MG) BY MOUTH DAILY    For all fails (red x), verify dose and sig.    If the refill does match what is on file, the RN can still proceed to approve the refill request.       If they do not match, route to the appropriate provider.             Passed - Medication indicated for associated  diagnosis     Medication is associated with one or more of the following diagnoses:   - Type 1 diabetes mellitus  - Type 2 diabetes mellitus  - Diabetic nephropathy; Prophylaxis  - Neuropathy due to diabetes mellitus; Prophylaxis  - Retinopathy due to diabetes mellitus; Prophylaxis  - Diabetes mellitus associated with cystic fibrosis  - Disorder of cardiovascular system; Prophylaxis - Type 1 diabetes mellitus   - Disorder of cardiovascular system; Prophylaxis - Type 2 diabetes mellitus            Passed - Patient is 18 years of age or older

## 2025-03-13 ENCOUNTER — TELEPHONE (OUTPATIENT)
Dept: ENDOCRINOLOGY | Facility: CLINIC | Age: 25
End: 2025-03-13
Payer: COMMERCIAL

## 2025-03-13 NOTE — TELEPHONE ENCOUNTER
Called patient and left voicemail. Patient has appointment on  3/17/25 . Need to collect 14 days of blood sugar readings if patient is using meter, or if patient is using CGM, need to get patients device uploaded to retrieve report for provider to review.  Nanda Rosario MA

## 2025-04-02 ENCOUNTER — TELEPHONE (OUTPATIENT)
Dept: ENDOCRINOLOGY | Facility: CLINIC | Age: 25
End: 2025-04-02
Payer: COMMERCIAL

## 2025-04-02 DIAGNOSIS — E10.65 TYPE 1 DIABETES MELLITUS WITH HYPERGLYCEMIA (H): ICD-10-CM

## 2025-04-07 DIAGNOSIS — E10.65 TYPE 1 DIABETES MELLITUS WITH HYPERGLYCEMIA (H): ICD-10-CM

## 2025-04-08 DIAGNOSIS — E10.65 TYPE 1 DIABETES MELLITUS WITH HYPERGLYCEMIA (H): ICD-10-CM

## 2025-04-08 RX ORDER — INSULIN LISPRO 100 [IU]/ML
INJECTION, SOLUTION INTRAVENOUS; SUBCUTANEOUS
Qty: 60 ML | Refills: 0 | Status: SHIPPED | OUTPATIENT
Start: 2025-04-08

## 2025-04-08 RX ORDER — ACYCLOVIR 400 MG/1
TABLET ORAL
Refills: 0 | OUTPATIENT
Start: 2025-04-08

## 2025-04-08 RX ORDER — INSULIN GLARGINE-YFGN 100 [IU]/ML
35 INJECTION, SOLUTION SUBCUTANEOUS DAILY
Qty: 15 ML | Refills: 0 | Status: SHIPPED | OUTPATIENT
Start: 2025-04-08

## 2025-04-08 RX ORDER — ACYCLOVIR 400 MG/1
TABLET ORAL
Qty: 9 EACH | Refills: 0 | Status: SHIPPED | OUTPATIENT
Start: 2025-04-08

## 2025-04-08 NOTE — TELEPHONE ENCOUNTER
I called and left a detailed message that the pt will need to establish care with a PCP ASAP to fill RX's, as he has not been seen with us in 2 years and has missed multiple appt's to establish with a new provider here.

## 2025-04-08 NOTE — TELEPHONE ENCOUNTER
"\"My son is scheduled to see the Endocrinologist on 11-3-25. We are looking to get a different Endocrinologist for him, but he can't even get an in person appointment with them and we need his insulin refilled until then. They (Endocrine) won't refill it since they said he no showed the last appt, but he had an issue trying to get his phone to work and he did call them 2 hours later to tell them he had an issue with his phone.\"     \"He has about 3 days worth of Insulin left. He currently uses dexcom and needs Humalog refilled before the 24 th. I think his Lantus will be okay.. He uses 1 pen a week..\"    Pt is scheduled to see PCP on 4-24-25.    Josy De Jesus RN    "

## 2025-04-08 NOTE — TELEPHONE ENCOUNTER
[facility]     Pt should contact primary provider to manage refill until he has establish care with new Endo provider.     Pt has not been seen by provider prior- pt needs to establish with new provider prior to further refills-   Pt's primary to manage- pt has not been seen by endo since 2023.   Pt has no showed Multiple appts to establish with new provider.

## 2025-04-08 NOTE — TELEPHONE ENCOUNTER
Pt has not been seen by provider prior- pt needs to establish with new provider prior to further refills-   Pt's primary to manage- pt has not been seen by endo since 2023.   Pt has no showed Multiple appts to establish with new provider.

## 2025-04-08 NOTE — TELEPHONE ENCOUNTER
Patient's mother calling, writer read her that patient needs to contact his PCP and mother states he has always seen the diabetes doctors for the last 10 years. Mother would like to talk to care team about this. If he can't get a refill, he'll end up in the hospital. Please call and advise. She states anytime is good to call her back

## 2025-04-08 NOTE — TELEPHONE ENCOUNTER
Pt mother is requesting a call back to discuss the following as she is concerned that he is running out of all of his insulin and supplies within the next few days here. Pt mom states that he has never seen his PCP that is on file as well. Please advise.

## 2025-04-09 RX ORDER — INSULIN LISPRO 100 [IU]/ML
INJECTION, SOLUTION INTRAVENOUS; SUBCUTANEOUS
Qty: 60 ML | Refills: 4 | OUTPATIENT
Start: 2025-04-09

## 2025-04-10 ENCOUNTER — TELEPHONE (OUTPATIENT)
Dept: FAMILY MEDICINE | Facility: CLINIC | Age: 25
End: 2025-04-10
Payer: COMMERCIAL

## 2025-04-10 NOTE — TELEPHONE ENCOUNTER
PRIOR AUTHORIZATION DENIED    Medication: INSULIN LISPRO (1 UNIT DIAL) 100 UNIT/ML SC SOPN  Insurance Company: [facility] - Phone [phone] Fax [phone]  Denial Date: 4/10/2025  Denial Reason(s):     Appeal Information:     Patient Notified: No

## 2025-04-10 NOTE — TELEPHONE ENCOUNTER
Call patient -  Insurance declined coverage of humalog  The information I received doesn't indicate what the problem was, why the humalog was declined.  Does he have enough insulin until his upcoming appt with me?  If not - We can potentially try sending as novolog, as often insurances cover one or the other.  Otherwise, does he know what insurance is preferring, or if there was some other reason for insurance decline?

## 2025-04-24 ENCOUNTER — OFFICE VISIT (OUTPATIENT)
Dept: FAMILY MEDICINE | Facility: CLINIC | Age: 25
End: 2025-04-24
Payer: COMMERCIAL

## 2025-04-24 VITALS
WEIGHT: 153 LBS | SYSTOLIC BLOOD PRESSURE: 110 MMHG | DIASTOLIC BLOOD PRESSURE: 64 MMHG | OXYGEN SATURATION: 100 % | BODY MASS INDEX: 21.42 KG/M2 | TEMPERATURE: 98.3 F | HEIGHT: 71 IN | HEART RATE: 76 BPM | RESPIRATION RATE: 16 BRPM

## 2025-04-24 DIAGNOSIS — E10.9 TYPE 1 DIABETES MELLITUS WITHOUT COMPLICATION (H): Primary | ICD-10-CM

## 2025-04-24 LAB
ANION GAP SERPL CALCULATED.3IONS-SCNC: 10 MMOL/L (ref 7–15)
BUN SERPL-MCNC: 9.4 MG/DL (ref 6–20)
CALCIUM SERPL-MCNC: 10.3 MG/DL (ref 8.8–10.4)
CHLORIDE SERPL-SCNC: 101 MMOL/L (ref 98–107)
CHOLEST SERPL-MCNC: 94 MG/DL
CREAT SERPL-MCNC: 0.99 MG/DL (ref 0.67–1.17)
CREAT UR-MCNC: 145.3 MG/DL
EGFRCR SERPLBLD CKD-EPI 2021: >90 ML/MIN/1.73M2
EST. AVERAGE GLUCOSE BLD GHB EST-MCNC: 143 MG/DL
FASTING STATUS PATIENT QL REPORTED: NO
FASTING STATUS PATIENT QL REPORTED: NO
GLUCOSE SERPL-MCNC: 92 MG/DL (ref 70–99)
HBA1C MFR BLD: 6.6 % (ref 0–5.6)
HCO3 SERPL-SCNC: 29 MMOL/L (ref 22–29)
HCV AB SERPL QL IA: NONREACTIVE
HDLC SERPL-MCNC: 36 MG/DL
HIV 1+2 AB+HIV1 P24 AG SERPL QL IA: NONREACTIVE
HOLD SPECIMEN: NORMAL
LDLC SERPL CALC-MCNC: 47 MG/DL
MICROALBUMIN UR-MCNC: <12 MG/L
MICROALBUMIN/CREAT UR: NORMAL MG/G{CREAT}
NONHDLC SERPL-MCNC: 58 MG/DL
POTASSIUM SERPL-SCNC: 4.3 MMOL/L (ref 3.4–5.3)
SODIUM SERPL-SCNC: 140 MMOL/L (ref 135–145)
TRIGL SERPL-MCNC: 53 MG/DL
TSH SERPL DL<=0.005 MIU/L-ACNC: 2.21 UIU/ML (ref 0.3–4.2)

## 2025-04-24 RX ORDER — ACYCLOVIR 400 MG/1
TABLET ORAL
Qty: 9 EACH | Refills: 3 | Status: SHIPPED | OUTPATIENT
Start: 2025-04-24

## 2025-04-24 RX ORDER — PEN NEEDLE, DIABETIC 32GX 5/32"
NEEDLE, DISPOSABLE MISCELLANEOUS
Qty: 400 EACH | Refills: 11 | Status: SHIPPED | OUTPATIENT
Start: 2025-04-24

## 2025-04-24 RX ORDER — LANCETS
EACH MISCELLANEOUS
Qty: 100 EACH | Refills: 11 | Status: SHIPPED | OUTPATIENT
Start: 2025-04-24

## 2025-04-24 RX ORDER — BLOOD KETONE TEST, STRIPS
STRIP MISCELLANEOUS
Qty: 50 STRIP | Refills: 2 | Status: SHIPPED | OUTPATIENT
Start: 2025-04-24

## 2025-04-24 RX ORDER — INSULIN GLARGINE-YFGN 100 [IU]/ML
32 INJECTION, SOLUTION SUBCUTANEOUS DAILY
Refills: 3 | Status: SHIPPED
Start: 2025-04-24

## 2025-04-24 ASSESSMENT — PAIN SCALES - GENERAL: PAINLEVEL_OUTOF10: NO PAIN (0)

## 2025-04-24 NOTE — NURSING NOTE
"Chief Complaint   Patient presents with    Diabetes       Initial Temp 98.3  F (36.8  C)   Ht 1.816 m (5' 11.5\")   BMI 21.73 kg/m   Estimated body mass index is 21.73 kg/m  as calculated from the following:    Height as of this encounter: 1.816 m (5' 11.5\").    Weight as of 1/19/24: 71.7 kg (158 lb).    Patient presents to the clinic using No DME    Is there anyone who you would like to be able to receive your results? No  If yes have patient fill out JANETTE      "

## 2025-04-24 NOTE — PROGRESS NOTES
{PROVIDER CHARTING PREFERENCE:613505}    Nupur Singh is a 25 year old, presenting for the following health issues:  No chief complaint on file.        4/24/2025     8:20 AM   Additional Questions   Roomed by Tran RODRIGUEZ CMA     History of Present Illness       Reason for visit:  Checkup first visit He is missing 7 dose(s) of medications per week.      Diabetes Follow-up  How often are you checking your blood sugar? Continuous glucose monitor  What time of day are you checking your blood sugars (select all that apply)?  Before meals, After meals, Before and after meals, and At bedtime  Have you had any blood sugars above 200?  Yes   Have you had any blood sugars below 70?  Yes   What symptoms do you notice when your blood sugar is low?  Shaky and Weak  What concerns do you have today about your diabetes? None, Blood sugar is often over 200, and Low blood sugar   Do you have any of these symptoms? (Select all that apply)  No numbness or tingling in feet.  No redness, sores or blisters on feet.  No complaints of excessive thirst.  No reports of blurry vision.  No significant changes to weight.  Have you had a diabetic eye exam in the last 12 months? No    Establish care.  Has endocrinology appt for Nov.  Last saw endocrinology in Nov 2023.  DM1 at age 9.  Had been on a pump in the past but he would sweat easily causing issues with insertion site.  Additionally he had latex allergy so had some trouble with certain insertion products.  Wants to go back to a pump.      A1c today 6.6.  Notes has lows after snacks but not meals.  Tends to have a snack before bed.  Lows down to 40s and 50s, at least every other day.  Doing better the last couple weeks about calibrating the dexcom which does seem to help a fair amount.  No issues with dexcom staying on or causing skin issues.      Occupation - works at [employer].  No diabetes related concerns.  Father is his boss.      BP Readings from Last 2 Encounters:   01/19/24  124/68   01/19/23 118/63     Hemoglobin A1C (%)   Date Value   04/21/2022 6.9 (A)   08/11/2021 7.7 (H)   01/23/2020 7.9 (A)     LDL Cholesterol Calculated (mg/dL)   Date Value   08/11/2021 49   08/30/2018 68   06/02/2016 84       {Reference  Diabetes Management Resources  Blood Glucose Log - 3 weeks  Blood Glucose Log with Food and Insulin Record :146331}    {additonal problems for provider to add (Optional):485331}    {ROS Picklists (Optional):506681}      Objective    There were no vitals taken for this visit.  There is no height or weight on file to calculate BMI.  Physical Exam   {Exam List (Optional):350481}    {Diagnostic Test Results (Optional):810787}        Signed Electronically by: Olesya Rodriguez PA-C  {Email feedback regarding this note to [email or web address]   :[id number]}

## 2025-04-24 NOTE — PATIENT INSTRUCTIONS
Keep upcoming appt with endocrinology  Set up with diabetic ed, eye doctor  Discussed definitely due for some vaccines

## 2025-04-24 NOTE — RESULT ENCOUNTER NOTE
Francisco    LDL cholesterol looks great.  HDL, a protective type of cholesterol, is lower than ideal.  It can be genetically low.  It may improve with exercise.  No further medication needed for that.  Thyroid is normal.  Blood salts and kidney function are all normal.  Hepatitis C lab, a screening lab usually done once a lifetime, is normal.  The other once a lifetime screen, for HIV, is still in process.   The urine test to check for protein spilling into the urine, is also in process.  I will be out of office tomorrow so you may hear from a colleague on your final results, or I will let you know final results next week.    Let me know if you have questions.    Olesya     
Xray Chest 1 View AP/PA

## 2025-04-28 ENCOUNTER — PATIENT OUTREACH (OUTPATIENT)
Dept: CARE COORDINATION | Facility: CLINIC | Age: 25
End: 2025-04-28
Payer: COMMERCIAL

## 2025-06-06 ENCOUNTER — TELEPHONE (OUTPATIENT)
Dept: FAMILY MEDICINE | Facility: CLINIC | Age: 25
End: 2025-06-06

## 2025-06-23 ENCOUNTER — TELEPHONE (OUTPATIENT)
Dept: FAMILY MEDICINE | Facility: CLINIC | Age: 25
End: 2025-06-23
Payer: COMMERCIAL

## 2025-06-23 DIAGNOSIS — E10.9 TYPE 1 DIABETES MELLITUS WITHOUT COMPLICATION (H): Primary | ICD-10-CM

## 2025-06-23 NOTE — TELEPHONE ENCOUNTER
Insulin vials have not been prescribed before, please advise what insulin should go in his pump. Pharmacy pended

## 2025-06-23 NOTE — TELEPHONE ENCOUNTER
"  General Call      Reason for Call:  Medication    What are your questions or concerns:  Pt's mom calling - they rcvd pt's pump for his diabetes, but they need the \"insulin vials for the pump\". Wondering if Olesya can send in refill to pharmacy.    Could we send this information to you in [place] or would you prefer to receive a phone call?:   Patient would prefer a phone call   Okay to leave a detailed message?: Yes at Home number on file [phone] (home)    Loren Power Patient    "

## 2025-06-24 RX ORDER — INSULIN LISPRO 100 [IU]/ML
INJECTION, SOLUTION INTRAVENOUS; SUBCUTANEOUS
Qty: 6 ML | Refills: 2 | Status: SHIPPED | OUTPATIENT
Start: 2025-06-24 | End: 2025-06-26

## 2025-06-25 ENCOUNTER — TELEPHONE (OUTPATIENT)
Dept: FAMILY MEDICINE | Facility: CLINIC | Age: 25
End: 2025-06-25
Payer: COMMERCIAL

## 2025-06-25 DIAGNOSIS — E10.9 TYPE 1 DIABETES MELLITUS WITHOUT COMPLICATION (H): Primary | ICD-10-CM

## 2025-06-26 RX ORDER — INSULIN ASPART 100 [IU]/ML
INJECTION, SOLUTION INTRAVENOUS; SUBCUTANEOUS
Qty: 10 ML | Refills: 1 | Status: SHIPPED | OUTPATIENT
Start: 2025-06-26 | End: 2025-06-26

## 2025-06-26 NOTE — TELEPHONE ENCOUNTER
Notify patient - insulin lispro (humalog) no longer preferred by insurance.  New prescription sent for insulin aspart (novolog).  Should be pretty similar results with his sugars but monitor a bit more closely when first switching.

## 2025-06-26 NOTE — TELEPHONE ENCOUNTER
PRIOR AUTHORIZATION DENIED    Medication: INSULIN LISPRO VIAL 100 UNIT/ML [facility]  Insurance Company: [employer] - Phone [phone] Fax [phone]  Denial Date: 6/25/2025  Denial Reason(s):         Appeal Information:       Patient Notified: NO

## 2025-06-27 ENCOUNTER — TELEPHONE (OUTPATIENT)
Dept: FAMILY MEDICINE | Facility: CLINIC | Age: 25
End: 2025-06-27
Payer: COMMERCIAL

## 2025-06-27 NOTE — TELEPHONE ENCOUNTER
Prior Authorization Retail Medication Request    Medication/Dose: Novolog  Diagnosis and ICD code (if different than what is on RX):  DM 1, E10.9  New/renewal/insurance change PA/secondary ins. PA:  Previously Tried and Failed:    Rationale:      Insurance   Primary:   Insurance ID:      Secondary (if applicable):  Insurance ID:      Pharmacy Information (if different than what is on RX)  Name:  [facility]  Phone:    Fax:    Clinic Information: [facility]  Preferred routing pool for dept communication: [id number]   Twyla REYES RN

## 2025-06-27 NOTE — TELEPHONE ENCOUNTER
Central Prior Authorization Team - Phone: [phone]     PA Initiation *via EPA 6/26/25    Medication: INSULIN ASPART VIAL 100 UNIT/ML  [facility]  Insurance Company: [employer] [place] - Phone [phone] Fax [phone]  Pharmacy Filling the Rx: [facility] - [place], [place] - [address]  Filling Pharmacy Phone: [phone]  Filling Pharmacy Fax:    Start Date: 6/26/2025

## 2025-07-01 NOTE — TELEPHONE ENCOUNTER
Central Prior Authorization Team - Phone: [phone]     Prior Authorization Not Needed per Insurance    Medication: INSULIN ASPART VIAL 100 UNIT/ML [facility]  Insurance Company: [employer] [place] - Phone [phone] Fax [phone]  Expected CoPay: $    Pharmacy Filling the Rx: [facility] - [place], [place] - [address]  Pharmacy Notified: yes  Patient Notified: yes Instructed pharmacy to notify patient once order is ready.

## 2025-07-12 ENCOUNTER — HEALTH MAINTENANCE LETTER (OUTPATIENT)
Age: 25
End: 2025-07-12

## 2025-08-25 ENCOUNTER — TELEPHONE (OUTPATIENT)
Dept: EDUCATION SERVICES | Facility: CLINIC | Age: 25
End: 2025-08-25

## 2025-08-25 ENCOUNTER — VIRTUAL VISIT (OUTPATIENT)
Dept: EDUCATION SERVICES | Facility: CLINIC | Age: 25
End: 2025-08-25
Attending: PHYSICIAN ASSISTANT
Payer: COMMERCIAL

## 2025-08-25 DIAGNOSIS — E10.9 TYPE 1 DIABETES MELLITUS WITHOUT COMPLICATION (H): ICD-10-CM

## 2025-08-25 DIAGNOSIS — E10.65 TYPE 1 DIABETES MELLITUS WITH HYPERGLYCEMIA (H): ICD-10-CM

## 2025-08-25 PROCEDURE — G0108 DIAB MANAGE TRN  PER INDIV: HCPCS | Mod: 95 | Performed by: PHARMACIST

## 2025-08-25 RX ORDER — URINE ACETONE TEST STRIPS
STRIP MISCELLANEOUS
Qty: 50 STRIP | Refills: 0 | Status: SHIPPED | OUTPATIENT
Start: 2025-08-25

## 2025-08-25 RX ORDER — INSULIN ASPART 100 [IU]/ML
INJECTION, SOLUTION INTRAVENOUS; SUBCUTANEOUS
Qty: 50 ML | Refills: 0 | Status: SHIPPED | OUTPATIENT
Start: 2025-08-25

## 2025-08-25 RX ORDER — INSULIN ASPART 100 [IU]/ML
INJECTION, SOLUTION INTRAVENOUS; SUBCUTANEOUS
Qty: 15 ML | Refills: 0 | Status: SHIPPED | OUTPATIENT
Start: 2025-08-25

## 2025-08-25 RX ORDER — INSULIN GLARGINE-YFGN 100 [IU]/ML
27 INJECTION, SOLUTION SUBCUTANEOUS DAILY
Qty: 15 ML | Refills: 0 | Status: SHIPPED | OUTPATIENT
Start: 2025-08-25